# Patient Record
Sex: FEMALE | Race: WHITE | NOT HISPANIC OR LATINO | Employment: PART TIME | URBAN - METROPOLITAN AREA
[De-identification: names, ages, dates, MRNs, and addresses within clinical notes are randomized per-mention and may not be internally consistent; named-entity substitution may affect disease eponyms.]

---

## 2017-09-21 ENCOUNTER — TRANSCRIBE ORDERS (OUTPATIENT)
Dept: ADMINISTRATIVE | Facility: HOSPITAL | Age: 27
End: 2017-09-21

## 2017-09-21 DIAGNOSIS — Z31.41 FERTILITY TESTING: Primary | ICD-10-CM

## 2017-09-29 ENCOUNTER — HOSPITAL ENCOUNTER (OUTPATIENT)
Dept: GENERAL RADIOLOGY | Facility: HOSPITAL | Age: 27
Discharge: HOME | End: 2017-09-29
Attending: SPECIALIST | Admitting: SPECIALIST

## 2017-09-29 DIAGNOSIS — Z31.41 FERTILITY TESTING: ICD-10-CM

## 2017-09-29 PROCEDURE — 0 IOPAMIDOL 61 % SOLUTION: Performed by: SPECIALIST

## 2017-09-29 PROCEDURE — 74740 X-RAY FEMALE GENITAL TRACT: CPT

## 2017-09-29 RX ADMIN — IOPAMIDOL 30 ML: 612 INJECTION, SOLUTION INTRAVENOUS at 08:16

## 2018-06-08 ENCOUNTER — TRANSCRIBE ORDERS (OUTPATIENT)
Dept: OBSTETRICS AND GYNECOLOGY | Facility: HOSPITAL | Age: 28
End: 2018-06-08

## 2018-06-08 DIAGNOSIS — Z36.3 ANTENATAL SCREENING FOR MALFORMATION USING ULTRASONICS: ICD-10-CM

## 2018-06-08 DIAGNOSIS — O30.002 TWIN GESTATION IN SECOND TRIMESTER, UNSPECIFIED MULTIPLE GESTATION TYPE: Primary | ICD-10-CM

## 2018-06-08 DIAGNOSIS — Z31.83 IN VITRO FERTILIZATION: ICD-10-CM

## 2018-06-28 ENCOUNTER — OFFICE VISIT (OUTPATIENT)
Dept: OBSTETRICS AND GYNECOLOGY | Facility: HOSPITAL | Age: 28
End: 2018-06-28

## 2018-06-28 ENCOUNTER — HOSPITAL ENCOUNTER (OUTPATIENT)
Dept: WOMENS IMAGING | Facility: HOSPITAL | Age: 28
Discharge: HOME | End: 2018-06-28
Admitting: OBSTETRICS & GYNECOLOGY

## 2018-06-28 VITALS
HEIGHT: 65 IN | SYSTOLIC BLOOD PRESSURE: 112 MMHG | BODY MASS INDEX: 32.52 KG/M2 | DIASTOLIC BLOOD PRESSURE: 64 MMHG | WEIGHT: 195.2 LBS

## 2018-06-28 DIAGNOSIS — O30.049 DICHORIONIC DIAMNIOTIC TWIN PREGNANCY, ANTEPARTUM: Primary | ICD-10-CM

## 2018-06-28 DIAGNOSIS — O09.819 PREGNANCY RESULTING FROM IN VITRO FERTILIZATION, ANTEPARTUM: ICD-10-CM

## 2018-06-28 DIAGNOSIS — O30.002 TWIN GESTATION IN SECOND TRIMESTER, UNSPECIFIED MULTIPLE GESTATION TYPE: ICD-10-CM

## 2018-06-28 DIAGNOSIS — Z36.3 ANTENATAL SCREENING FOR MALFORMATION USING ULTRASONICS: ICD-10-CM

## 2018-06-28 DIAGNOSIS — Z31.83 IN VITRO FERTILIZATION: ICD-10-CM

## 2018-06-28 PROCEDURE — 76812 OB US DETAILED ADDL FETUS: CPT | Performed by: OBSTETRICS & GYNECOLOGY

## 2018-06-28 PROCEDURE — 76812 OB US DETAILED ADDL FETUS: CPT

## 2018-06-28 PROCEDURE — 76811 OB US DETAILED SNGL FETUS: CPT

## 2018-06-28 PROCEDURE — 76811 OB US DETAILED SNGL FETUS: CPT | Performed by: OBSTETRICS & GYNECOLOGY

## 2018-06-28 RX ORDER — FOLIC ACID 1 MG/1
2 TABLET ORAL DAILY
COMMUNITY
End: 2018-10-24 | Stop reason: HOSPADM

## 2018-06-28 RX ORDER — PRENATAL WITH FERROUS FUM AND FOLIC ACID 3080; 920; 120; 400; 22; 1.84; 3; 20; 10; 1; 12; 200; 27; 25; 2 [IU]/1; [IU]/1; MG/1; [IU]/1; MG/1; MG/1; MG/1; MG/1; MG/1; MG/1; UG/1; MG/1; MG/1; MG/1; MG/1
1 TABLET ORAL DAILY
COMMUNITY

## 2018-06-28 RX ORDER — LEVOTHYROXINE SODIUM 0.05 MG/1
50 TABLET ORAL DAILY
COMMUNITY
End: 2022-12-15

## 2018-07-26 ENCOUNTER — OFFICE VISIT (OUTPATIENT)
Dept: OBSTETRICS AND GYNECOLOGY | Facility: HOSPITAL | Age: 28
End: 2018-07-26

## 2018-07-26 ENCOUNTER — HOSPITAL ENCOUNTER (OUTPATIENT)
Dept: WOMENS IMAGING | Facility: HOSPITAL | Age: 28
Discharge: HOME | End: 2018-07-26
Attending: OBSTETRICS & GYNECOLOGY | Admitting: OBSTETRICS & GYNECOLOGY

## 2018-07-26 VITALS — BODY MASS INDEX: 33.8 KG/M2 | WEIGHT: 200 LBS | DIASTOLIC BLOOD PRESSURE: 66 MMHG | SYSTOLIC BLOOD PRESSURE: 117 MMHG

## 2018-07-26 DIAGNOSIS — O30.042 DICHORIONIC DIAMNIOTIC TWIN PREGNANCY IN SECOND TRIMESTER: Primary | ICD-10-CM

## 2018-07-26 DIAGNOSIS — O30.049 DICHORIONIC DIAMNIOTIC TWIN PREGNANCY, ANTEPARTUM: ICD-10-CM

## 2018-07-26 DIAGNOSIS — O09.812 PREGNANCY RESULTING FROM ASSISTED REPRODUCTIVE TECHNOLOGY IN SECOND TRIMESTER: ICD-10-CM

## 2018-07-26 DIAGNOSIS — O09.819 PREGNANCY RESULTING FROM IN VITRO FERTILIZATION, ANTEPARTUM: ICD-10-CM

## 2018-07-26 PROCEDURE — 76816 OB US FOLLOW-UP PER FETUS: CPT

## 2018-07-26 PROCEDURE — 76825 ECHO EXAM OF FETAL HEART: CPT | Performed by: OBSTETRICS & GYNECOLOGY

## 2018-07-26 PROCEDURE — 93325 DOPPLER ECHO COLOR FLOW MAPG: CPT | Performed by: OBSTETRICS & GYNECOLOGY

## 2018-07-26 PROCEDURE — 93325 DOPPLER ECHO COLOR FLOW MAPG: CPT

## 2018-07-26 PROCEDURE — 76825 ECHO EXAM OF FETAL HEART: CPT

## 2018-07-26 PROCEDURE — 76816 OB US FOLLOW-UP PER FETUS: CPT | Performed by: OBSTETRICS & GYNECOLOGY

## 2018-07-26 NOTE — PROGRESS NOTES
Documentation of the ultasound findings, images, and interpretations with be available in the patient's Viewpoint report located in the Chart Review Imaging tab in [employer].

## 2018-07-26 NOTE — PROGRESS NOTES
Denies problems. Reports genetic screening test was normal. Today reports had some vaginal bleeding at 10 weeks and received RhoGam. States she had another dose of RhoGam yesterday per Dr. Kaplan's order but denies any bleeding or problems. Next OB visit 8/16/2018. Discussed tDap & Flu shot recommendations.

## 2018-08-23 ENCOUNTER — OFFICE VISIT (OUTPATIENT)
Dept: OBSTETRICS AND GYNECOLOGY | Facility: HOSPITAL | Age: 28
End: 2018-08-23

## 2018-08-23 ENCOUNTER — HOSPITAL ENCOUNTER (OUTPATIENT)
Dept: WOMENS IMAGING | Facility: HOSPITAL | Age: 28
Discharge: HOME | End: 2018-08-23
Attending: OBSTETRICS & GYNECOLOGY | Admitting: OBSTETRICS & GYNECOLOGY

## 2018-08-23 VITALS — BODY MASS INDEX: 33.63 KG/M2 | WEIGHT: 199 LBS | SYSTOLIC BLOOD PRESSURE: 125 MMHG | DIASTOLIC BLOOD PRESSURE: 72 MMHG

## 2018-08-23 DIAGNOSIS — O09.812 PREGNANCY RESULTING FROM ASSISTED REPRODUCTIVE TECHNOLOGY IN SECOND TRIMESTER: ICD-10-CM

## 2018-08-23 DIAGNOSIS — O30.042 DICHORIONIC DIAMNIOTIC TWIN PREGNANCY IN SECOND TRIMESTER: ICD-10-CM

## 2018-08-23 DIAGNOSIS — O30.042 DICHORIONIC DIAMNIOTIC TWIN PREGNANCY IN SECOND TRIMESTER: Primary | ICD-10-CM

## 2018-08-23 PROCEDURE — 76816 OB US FOLLOW-UP PER FETUS: CPT | Performed by: OBSTETRICS & GYNECOLOGY

## 2018-08-23 PROCEDURE — 76816 OB US FOLLOW-UP PER FETUS: CPT

## 2018-08-23 NOTE — PROGRESS NOTES
Pt denies lof, vag bleeding or cramping. Pt states she received dose of Rhogam again at 22 weeks per Dr's request to have 12 week Rhogam follow up injection. Denies any bleeding since 10 weeks.

## 2018-09-20 ENCOUNTER — HOSPITAL ENCOUNTER (OUTPATIENT)
Dept: WOMENS IMAGING | Facility: HOSPITAL | Age: 28
Discharge: HOME | End: 2018-09-20
Attending: OBSTETRICS & GYNECOLOGY | Admitting: OBSTETRICS & GYNECOLOGY

## 2018-09-20 ENCOUNTER — OFFICE VISIT (OUTPATIENT)
Dept: OBSTETRICS AND GYNECOLOGY | Facility: HOSPITAL | Age: 28
End: 2018-09-20

## 2018-09-20 VITALS — BODY MASS INDEX: 34.98 KG/M2 | SYSTOLIC BLOOD PRESSURE: 133 MMHG | DIASTOLIC BLOOD PRESSURE: 83 MMHG | WEIGHT: 207 LBS

## 2018-09-20 DIAGNOSIS — O09.812 PREGNANCY RESULTING FROM ASSISTED REPRODUCTIVE TECHNOLOGY IN SECOND TRIMESTER: ICD-10-CM

## 2018-09-20 DIAGNOSIS — O30.042 DICHORIONIC DIAMNIOTIC TWIN PREGNANCY IN SECOND TRIMESTER: Primary | ICD-10-CM

## 2018-09-20 DIAGNOSIS — O30.042 DICHORIONIC DIAMNIOTIC TWIN PREGNANCY IN SECOND TRIMESTER: ICD-10-CM

## 2018-09-20 PROCEDURE — 76816 OB US FOLLOW-UP PER FETUS: CPT | Performed by: OBSTETRICS & GYNECOLOGY

## 2018-09-20 PROCEDURE — 76816 OB US FOLLOW-UP PER FETUS: CPT

## 2018-09-20 NOTE — PROGRESS NOTES
Pt denies lof, vag bleeding or cramping. States she has noticed increased swelling bilaterally in feet and lower legs the last several days but states she has been working and on her feet a lot. Denies ha or blurred vision.

## 2018-09-20 NOTE — PROGRESS NOTES
Documentation of the ultasound findings, images, and interpretations with be available in the patient's Viewpoint report located in the Chart Review Imaging tab in [employer].

## 2018-10-18 ENCOUNTER — HOSPITAL ENCOUNTER (INPATIENT)
Facility: HOSPITAL | Age: 28
LOS: 6 days | Discharge: HOME | End: 2018-10-24
Attending: OBSTETRICS & GYNECOLOGY | Admitting: OBSTETRICS & GYNECOLOGY

## 2018-10-18 ENCOUNTER — HOSPITAL ENCOUNTER (OUTPATIENT)
Dept: WOMENS IMAGING | Facility: HOSPITAL | Age: 28
Discharge: HOME | End: 2018-10-18
Attending: OBSTETRICS & GYNECOLOGY | Admitting: OBSTETRICS & GYNECOLOGY

## 2018-10-18 ENCOUNTER — OFFICE VISIT (OUTPATIENT)
Dept: OBSTETRICS AND GYNECOLOGY | Facility: HOSPITAL | Age: 28
End: 2018-10-18

## 2018-10-18 VITALS — DIASTOLIC BLOOD PRESSURE: 98 MMHG | SYSTOLIC BLOOD PRESSURE: 146 MMHG | BODY MASS INDEX: 35.46 KG/M2 | WEIGHT: 209.8 LBS

## 2018-10-18 DIAGNOSIS — O09.812 PREGNANCY RESULTING FROM ASSISTED REPRODUCTIVE TECHNOLOGY IN SECOND TRIMESTER: ICD-10-CM

## 2018-10-18 DIAGNOSIS — O30.042 DICHORIONIC DIAMNIOTIC TWIN PREGNANCY IN SECOND TRIMESTER: ICD-10-CM

## 2018-10-18 DIAGNOSIS — O16.3 ELEVATED BLOOD PRESSURE AFFECTING PREGNANCY IN THIRD TRIMESTER, ANTEPARTUM: ICD-10-CM

## 2018-10-18 DIAGNOSIS — O30.009 TWIN DELIVERY BY C-SECTION: ICD-10-CM

## 2018-10-18 DIAGNOSIS — O30.043 DICHORIONIC DIAMNIOTIC TWIN PREGNANCY IN THIRD TRIMESTER: Primary | ICD-10-CM

## 2018-10-18 DIAGNOSIS — O14.93 PREECLAMPSIA, THIRD TRIMESTER: ICD-10-CM

## 2018-10-18 PROBLEM — Z34.90 PREGNANCY: Status: ACTIVE | Noted: 2018-10-18

## 2018-10-18 LAB
ABO GROUP BLD: NORMAL
ALP SERPL-CCNC: 260 U/L (ref 25–100)
ALT SERPL W P-5'-P-CCNC: 10 U/L (ref 7–40)
ANTI-D, PASSIVE: NORMAL
AST SERPL-CCNC: 19 U/L (ref 0–33)
BACTERIA UR QL AUTO: ABNORMAL /HPF
BILIRUB BLD-MCNC: NEGATIVE MG/DL
BILIRUB SERPL-MCNC: 0.2 MG/DL (ref 0.3–1.2)
BILIRUB UR QL STRIP: NEGATIVE
BLD GP AB SCN SERPL QL: POSITIVE
CLARITY UR: CLEAR
CLARITY, POC: CLEAR
COLOR UR: YELLOW
COLOR UR: YELLOW
CREAT BLD-MCNC: 0.77 MG/DL (ref 0.6–1.3)
CREAT BLD-MCNC: 0.77 MG/DL (ref 0.6–1.3)
DEPRECATED RDW RBC AUTO: 43.7 FL (ref 37–54)
ERYTHROCYTE [DISTWIDTH] IN BLOOD BY AUTOMATED COUNT: 14.2 % (ref 11.3–14.5)
GFR SERPL CREATININE-BSD FRML MDRD: 89 ML/MIN/1.73
GLUCOSE UR STRIP-MCNC: NEGATIVE MG/DL
GLUCOSE UR STRIP-MCNC: NEGATIVE MG/DL
HCT VFR BLD AUTO: 33.1 % (ref 34.5–44)
HGB BLD-MCNC: 10.7 G/DL (ref 11.5–15.5)
HGB UR QL STRIP.AUTO: NEGATIVE
HYALINE CASTS UR QL AUTO: ABNORMAL /LPF
KETONES UR QL STRIP: NEGATIVE
KETONES UR QL: NEGATIVE
LDH SERPL-CCNC: 228 U/L (ref 120–246)
LEUKOCYTE EST, POC: NEGATIVE
LEUKOCYTE ESTERASE UR QL STRIP.AUTO: ABNORMAL
MCH RBC QN AUTO: 27.4 PG (ref 27–31)
MCHC RBC AUTO-ENTMCNC: 32.3 G/DL (ref 32–36)
MCV RBC AUTO: 84.7 FL (ref 80–99)
NITRITE UR QL STRIP: NEGATIVE
NITRITE UR-MCNC: NEGATIVE MG/ML
PH UR STRIP.AUTO: 5.5 [PH] (ref 5–8)
PH UR: 6 [PH] (ref 5–8)
PLATELET # BLD AUTO: 161 10*3/MM3 (ref 150–450)
PMV BLD AUTO: 12 FL (ref 6–12)
PROT UR QL STRIP: ABNORMAL
PROT UR STRIP-MCNC: ABNORMAL MG/DL
RBC # BLD AUTO: 3.91 10*6/MM3 (ref 3.89–5.14)
RBC # UR STRIP: NEGATIVE /UL
RBC # UR: ABNORMAL /HPF
REF LAB TEST METHOD: ABNORMAL
RH BLD: NEGATIVE
SP GR UR STRIP: 1.01 (ref 1–1.03)
SP GR UR: 1.01 (ref 1–1.03)
SQUAMOUS #/AREA URNS HPF: ABNORMAL /HPF
T&S EXPIRATION DATE: NORMAL
URATE SERPL-MCNC: 6.1 MG/DL (ref 3.1–7.8)
UROBILINOGEN UR QL STRIP: ABNORMAL
UROBILINOGEN UR QL: NORMAL
WBC NRBC COR # BLD: 8.11 10*3/MM3 (ref 3.5–10.8)
WBC UR QL AUTO: ABNORMAL /HPF

## 2018-10-18 PROCEDURE — 86850 RBC ANTIBODY SCREEN: CPT | Performed by: OBSTETRICS & GYNECOLOGY

## 2018-10-18 PROCEDURE — 99223 1ST HOSP IP/OBS HIGH 75: CPT | Performed by: OBSTETRICS & GYNECOLOGY

## 2018-10-18 PROCEDURE — 85027 COMPLETE CBC AUTOMATED: CPT | Performed by: OBSTETRICS & GYNECOLOGY

## 2018-10-18 PROCEDURE — 84450 TRANSFERASE (AST) (SGOT): CPT | Performed by: OBSTETRICS & GYNECOLOGY

## 2018-10-18 PROCEDURE — 84460 ALANINE AMINO (ALT) (SGPT): CPT | Performed by: OBSTETRICS & GYNECOLOGY

## 2018-10-18 PROCEDURE — 86901 BLOOD TYPING SEROLOGIC RH(D): CPT | Performed by: OBSTETRICS & GYNECOLOGY

## 2018-10-18 PROCEDURE — 25010000002 BETAMETHASONE ACET & SOD PHOS PER 4 MG: Performed by: OBSTETRICS & GYNECOLOGY

## 2018-10-18 PROCEDURE — 83615 LACTATE (LD) (LDH) ENZYME: CPT | Performed by: OBSTETRICS & GYNECOLOGY

## 2018-10-18 PROCEDURE — 82247 BILIRUBIN TOTAL: CPT | Performed by: OBSTETRICS & GYNECOLOGY

## 2018-10-18 PROCEDURE — 84550 ASSAY OF BLOOD/URIC ACID: CPT | Performed by: OBSTETRICS & GYNECOLOGY

## 2018-10-18 PROCEDURE — 76816 OB US FOLLOW-UP PER FETUS: CPT

## 2018-10-18 PROCEDURE — 76820 UMBILICAL ARTERY ECHO: CPT

## 2018-10-18 PROCEDURE — 76819 FETAL BIOPHYS PROFIL W/O NST: CPT | Performed by: OBSTETRICS & GYNECOLOGY

## 2018-10-18 PROCEDURE — 76819 FETAL BIOPHYS PROFIL W/O NST: CPT

## 2018-10-18 PROCEDURE — 25010000002 MAGNESIUM SULFATE-LACT RINGERS 40 GM/580ML SOLUTION: Performed by: OBSTETRICS & GYNECOLOGY

## 2018-10-18 PROCEDURE — 82565 ASSAY OF CREATININE: CPT | Performed by: OBSTETRICS & GYNECOLOGY

## 2018-10-18 PROCEDURE — 81001 URINALYSIS AUTO W/SCOPE: CPT | Performed by: OBSTETRICS & GYNECOLOGY

## 2018-10-18 PROCEDURE — 86870 RBC ANTIBODY IDENTIFICATION: CPT | Performed by: OBSTETRICS & GYNECOLOGY

## 2018-10-18 PROCEDURE — 86900 BLOOD TYPING SEROLOGIC ABO: CPT | Performed by: OBSTETRICS & GYNECOLOGY

## 2018-10-18 PROCEDURE — 59025 FETAL NON-STRESS TEST: CPT

## 2018-10-18 PROCEDURE — 76820 UMBILICAL ARTERY ECHO: CPT | Performed by: OBSTETRICS & GYNECOLOGY

## 2018-10-18 PROCEDURE — 76816 OB US FOLLOW-UP PER FETUS: CPT | Performed by: OBSTETRICS & GYNECOLOGY

## 2018-10-18 PROCEDURE — 84075 ASSAY ALKALINE PHOSPHATASE: CPT | Performed by: OBSTETRICS & GYNECOLOGY

## 2018-10-18 RX ORDER — BETAMETHASONE SODIUM PHOSPHATE AND BETAMETHASONE ACETATE 3; 3 MG/ML; MG/ML
12 INJECTION, SUSPENSION INTRA-ARTICULAR; INTRALESIONAL; INTRAMUSCULAR; SOFT TISSUE EVERY 24 HOURS
Status: COMPLETED | OUTPATIENT
Start: 2018-10-18 | End: 2018-10-19

## 2018-10-18 RX ORDER — LABETALOL HYDROCHLORIDE 5 MG/ML
20-80 INJECTION, SOLUTION INTRAVENOUS
Status: DISPENSED | OUTPATIENT
Start: 2018-10-18 | End: 2018-10-19

## 2018-10-18 RX ORDER — DIPHENHYDRAMINE HCL 25 MG
25 CAPSULE ORAL EVERY 6 HOURS PRN
COMMUNITY
End: 2018-10-24 | Stop reason: HOSPADM

## 2018-10-18 RX ORDER — AZITHROMYCIN 250 MG/1
250 TABLET, FILM COATED ORAL DAILY
COMMUNITY
End: 2018-10-24 | Stop reason: HOSPADM

## 2018-10-18 RX ORDER — SODIUM CHLORIDE 0.9 % (FLUSH) 0.9 %
3 SYRINGE (ML) INJECTION EVERY 12 HOURS SCHEDULED
Status: DISCONTINUED | OUTPATIENT
Start: 2018-10-18 | End: 2018-10-23

## 2018-10-18 RX ORDER — MAGNESIUM SULF/RINGERS LACTATE 40 G/500ML
2 PLASTIC BAG, INJECTION (ML) INTRAVENOUS CONTINUOUS
Status: DISCONTINUED | OUTPATIENT
Start: 2018-10-18 | End: 2018-10-21

## 2018-10-18 RX ORDER — PRENATAL VIT/IRON FUM/FOLIC AC 27MG-0.8MG
1 TABLET ORAL DAILY
Status: DISCONTINUED | OUTPATIENT
Start: 2018-10-18 | End: 2018-10-19

## 2018-10-18 RX ORDER — DEXTROSE AND SODIUM CHLORIDE 5; .2 G/100ML; G/100ML
96 INJECTION, SOLUTION INTRAVENOUS CONTINUOUS
Status: DISCONTINUED | OUTPATIENT
Start: 2018-10-18 | End: 2018-10-23

## 2018-10-18 RX ORDER — LEVOTHYROXINE SODIUM 0.03 MG/1
50 TABLET ORAL
Status: DISCONTINUED | OUTPATIENT
Start: 2018-10-19 | End: 2018-10-24 | Stop reason: HOSPADM

## 2018-10-18 RX ORDER — SODIUM CHLORIDE 0.9 % (FLUSH) 0.9 %
5 SYRINGE (ML) INJECTION AS NEEDED
Status: DISCONTINUED | OUTPATIENT
Start: 2018-10-18 | End: 2018-10-20

## 2018-10-18 RX ORDER — GUAIFENESIN AND DEXTROMETHORPHAN HYDROBROMIDE 600; 30 MG/1; MG/1
1 TABLET, EXTENDED RELEASE ORAL 2 TIMES DAILY PRN
Status: DISCONTINUED | OUTPATIENT
Start: 2018-10-18 | End: 2018-10-24 | Stop reason: HOSPADM

## 2018-10-18 RX ORDER — FAMOTIDINE 10 MG/ML
20 INJECTION, SOLUTION INTRAVENOUS 2 TIMES DAILY PRN
Status: DISCONTINUED | OUTPATIENT
Start: 2018-10-18 | End: 2018-10-23

## 2018-10-18 RX ADMIN — DEXTROSE AND SODIUM CHLORIDE 96 ML/HR: 5; 200 INJECTION, SOLUTION INTRAVENOUS at 17:00

## 2018-10-18 RX ADMIN — PRENATAL VIT W/ FE FUMARATE-FA TAB 27-0.8 MG 1 TABLET: 27-0.8 TAB at 17:30

## 2018-10-18 RX ADMIN — LABETALOL HYDROCHLORIDE 20 MG: 5 INJECTION INTRAVENOUS at 23:52

## 2018-10-18 RX ADMIN — LABETALOL HYDROCHLORIDE 20 MG: 5 INJECTION INTRAVENOUS at 23:04

## 2018-10-18 RX ADMIN — FAMOTIDINE 20 MG: 10 INJECTION, SOLUTION INTRAVENOUS at 23:54

## 2018-10-18 RX ADMIN — LABETALOL HYDROCHLORIDE 20 MG: 5 INJECTION INTRAVENOUS at 19:31

## 2018-10-18 RX ADMIN — BETAMETHASONE SODIUM PHOSPHATE AND BETAMETHASONE ACETATE 12 MG: 3; 3 INJECTION, SUSPENSION INTRA-ARTICULAR; INTRALESIONAL; INTRAMUSCULAR at 17:30

## 2018-10-18 RX ADMIN — Medication 2 G/HR: at 17:39

## 2018-10-18 NOTE — PROGRESS NOTES
Documentation of the ultasound findings, images, and interpretations with be available in the patient's Viewpoint report located in the Chart Review Imaging tab in [employer]

## 2018-10-18 NOTE — PROGRESS NOTES
Reports has a cold; she picked up a rx for z-júnior today but has not started it yet. Next OB visit in one week. Admits is stressed because she arrived late due to traffic. Denies all s/s of preeclampsia.

## 2018-10-18 NOTE — H&P
Maternal Fetal Medicine Admission Note    Patient Name:  Yamilex Carty  :  1990  MRN:  [id number]  Date of Service:  10/18/2018  Referring Provider: Jen Kaplan     ID:  28 y.o.  at 34w0d    Chief Complaint:   Chief Complaint   Patient presents with   • Headache   • fetal growth lag       Pregnancy course significant for:    Patient Active Problem List   Diagnosis   • Dichorionic diamniotic twin pregnancy in second trimester   • Pregnancy resulting from assisted reproductive technology in second trimester   • Pregnancy   • Preeclampsia, third trimester       History of Present Illness:  28-year-old white female  1 para 0 at 34 weeks 0 days gestation with prenatal care by Dr. Jen Kaplan in [facility].  The patient has a twin intrauterine pregnancy diamniotic / dichorionic as a result of in vitro fertilization.  She presented to our office today in follow-up for fetal growth.  She complains of an on and off headache since last .  She has noted that the headache was relieved with Tylenol.  She has noted some decreased movement in the past week, particularly at night.   On exam today, her initial blood pressure was 149/100 with a repeat value of 146/98.  Urine protein is 2+ on dipstick.  She denies right upper quadrant pain, leakage of fluid per vagina and vaginal bleeding.  An ultrasound notes that Twin A has appropriate growth with an estimated fetal weight of 2436 g. Amniotic fluid, biophysical profile and Dopplers are zaina for Twin A.  Twin B unfortunately reveals an overall 2 week lag in growth with a four-week lag in the fetal abdominal circumference measurement.  The estimated fetal weight is 1705 g.  The umbilical artery Dopplers reveal absent end-diastolic flow.  The biophysical profile was however 8 of 8.  I discussed the clinical situation by phone with Dr Kaplan.  I noted that inpatient evaluation of preeclampsia was necessary given the above findings.    "Kaplan noted that at the current gestational age, if delivery was required within the next week that the infants would be transported out of [place].  As such, we elected for admission to [facility]    Prenatal Labs   Lab Results   Component Value Date    HGB 10.7 (L) 10/18/2018    ABO O 10/18/2018    RH Negative 10/18/2018    ABSCRN Positive 10/18/2018       REVIEW OF SYSTEMS    Negative: Fever, chills, vision change, shortness of breath, nausea, vomiting, rash, joint pain, rupture of membranes, vaginal bleeding, right upper quadrant pain, contractions  Positive: Sore throat starting on , swelling described as \"a little bit\", dizziness since Tuesday, bruising in her legs for the past few weeks, headache beginning  but improved with Tylenol.  She has noted decreased fetal movement over the past 1 week particularly at night.    OB HISTORY    OB History    Para Term  AB Living   1 0 0 0 0 0   SAB TAB Ectopic Molar Multiple Live Births   0 0 0 0 0 0      # Outcome Date GA Lbr Hay/2nd Weight Sex Delivery Anes PTL Lv   1 Current               Obstetric Comments   Pregnancy #1 - IVF conception; fresh cycle; no donors.        PAST MEDICAL HISTORY    Past Medical History:   Diagnosis Date   • Female infertility unexplained after evaluation    • History of anxiety     stopped meds    • Hypothyroidism 2017       PAST SURGICAL HISTORY      has a past surgical history that includes Tonsillectomy and adenoidectomy (); [place] tooth extraction (); endoscopy and colonoscopy (); and Fertility Surgery (2018).    CURRENT MEDICATIONS       Current Facility-Administered Medications:   •  betamethasone acetate-betamethasone sodium phosphate (CELESTONE SOLUSPAN) injection 12 mg, 12 mg, Intramuscular, Q24H, Bari Hughes, DO, 12 mg at 10/18/18 1730  •  dextrose 5 % and sodium chloride 0.2 % infusion, 96 mL/hr, Intravenous, Continuous, Bari Hughes, DO, Last Rate: " 96 mL/hr at 10/18/18 1700, 96 mL/hr at 10/18/18 1700  •  [START ON 10/19/2018] levothyroxine (SYNTHROID, LEVOTHROID) tablet 50 mcg, 50 mcg, Oral, Q AM, Bari Hughes DO  •  Magnesium Sulfate-Lact Ringers 40 GM/580ML, 2 g/hr, Intravenous, Continuous, Bari Hughes, DO, Last Rate: 29 mL/hr at 10/18/18 1739, 2 g/hr at 10/18/18 1739  •  prenatal vitamin 27-0.8 tablet 1 tablet, 1 tablet, Oral, Daily, Bari Hughes DO, 1 tablet at 10/18/18 1730  •  sodium chloride 0.9 % flush 3 mL, 3 mL, Intravenous, Q12H, Bari Hughes DO  •  sodium chloride 0.9 % flush 5 mL, 5 mL, Intravenous, PRN, Bari Hughes DO    ALLERGIES     Naproxen which causes hives    SOCIAL HISTORY     History   Smoking Status   • Never Smoker   Smokeless Tobacco   • Never Used     History   Alcohol Use No     History   Drug Use No   The patient is been  for 4 years.  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  Eyes:  PERRLA, EOMI  ENMT:  Supple, No TM   Respiratory:  Lungs are clear to auscultation bilaterally, normal breath sounds.   Cardiovascular:  Normal rate and rhythm, no murmurs.   Gastrointestinal:  Soft, gravid, nontender.  She has a tattoo of a star in her right lower abdomen  Uterus: Soft, nontender. Fundal height accelerated for dates.  The fetuses are currently in the transverse transverse presentations  Neurologic:  Alert &  oriented x 3,  no focal deficits noted.  DTRs 3+  Psychiatric:  Speech and behavior appropriate.   Extremities: no cyanosis, clubbing. Edema 1+, no evidence of DVT.    Fetal heart rate tracing review  Non-Stress Test:    Fetal Heart Rate Assessment   Method:     Beats/min:     Baseline:     Varibility:     Accels:     Decels:     Tracing Category:       Uterine Assessment   Method:     Frequency (min):     Ctx Count in 10 min:     Duration:     Intensity:     Intensity by IUPC:     Resting Tone:     Resting Tone by IUPC:     [place] Units:       Cervix: Exam by:     Dilation:     Effacement:     Station:       Ultrasound:  Twin intrauterine pregnancy in the transverse transverse presentations.  Diamniotic dichorionic.  Estimated fetal weight twin A 2436 g.  Twin B 1705 g.  Absent end-diastolic flow on umbilical Doppler studies for twin B    Labs:  Labs:  Lab Results   Component Value Date    WBC 8.11 10/18/2018    HGB 10.7 (L) 10/18/2018    HCT 33.1 (L) 10/18/2018    MCV 84.7 10/18/2018     10/18/2018    URICACID 6.1 10/18/2018    AST 19 10/18/2018    ALT 10 10/18/2018     10/18/2018       Results from last 7 days  Lab Units 10/18/18  1547   ABO TYPING  O   RH TYPING  Negative   ANTIBODY SCREEN  Positive       ASSESSMENT/PLAN:       28 y.o. female  at 34w0d with Preeclampsia, third trimester  1.  Twin intrauterine pregnancy transverse / transverse presentations,  diamniotic / dichorionic  2.  Hypothyroidism  3.  Obesity with a BMI of 35  4.  History of anxiety  5.  Abnormal umbilical artery Doppler studies for twin B with growth restriction for twin B    Plan  1.  Admit to labor and delivery  2.  Steroids for fetal lung maturity enhancement  3.  When necessary labetalol for blood pressure greater than 160/110   4.  Should the patient meet persistent severe criteria of preeclampsia, would advocate magnesium sulfate for antiseizure prophylaxis and proceeding with delivery.  Given the transverse  / transverse presentations  delivery will be necessary  5.  NICU consultation given probable need for  delivery        Approximately 35 minutes floor time was spent in care of this patient, of which greater than 50% was spent in face-to-face consultation and coordination of care.    Jenaro Meyer MD     10/18/2018   6:08 PM

## 2018-10-19 PROCEDURE — 82575 CREATININE CLEARANCE TEST: CPT | Performed by: OBSTETRICS & GYNECOLOGY

## 2018-10-19 PROCEDURE — 25010000002 ONDANSETRON PER 1 MG: Performed by: OBSTETRICS & GYNECOLOGY

## 2018-10-19 PROCEDURE — 25010000002 BETAMETHASONE ACET & SOD PHOS PER 4 MG: Performed by: OBSTETRICS & GYNECOLOGY

## 2018-10-19 PROCEDURE — 59025 FETAL NON-STRESS TEST: CPT

## 2018-10-19 PROCEDURE — S0260 H&P FOR SURGERY: HCPCS | Performed by: OBSTETRICS & GYNECOLOGY

## 2018-10-19 PROCEDURE — 99231 SBSQ HOSP IP/OBS SF/LOW 25: CPT | Performed by: OBSTETRICS & GYNECOLOGY

## 2018-10-19 PROCEDURE — 81050 URINALYSIS VOLUME MEASURE: CPT | Performed by: OBSTETRICS & GYNECOLOGY

## 2018-10-19 PROCEDURE — 84156 ASSAY OF PROTEIN URINE: CPT | Performed by: OBSTETRICS & GYNECOLOGY

## 2018-10-19 RX ORDER — ONDANSETRON 2 MG/ML
4 INJECTION INTRAMUSCULAR; INTRAVENOUS ONCE
Status: COMPLETED | OUTPATIENT
Start: 2018-10-19 | End: 2018-10-19

## 2018-10-19 RX ORDER — PRENATAL VIT/IRON FUM/FOLIC AC 27MG-0.8MG
1 TABLET ORAL
Status: DISCONTINUED | OUTPATIENT
Start: 2018-10-19 | End: 2018-10-24 | Stop reason: HOSPADM

## 2018-10-19 RX ADMIN — DEXTROSE AND SODIUM CHLORIDE 96 ML/HR: 5; 200 INJECTION, SOLUTION INTRAVENOUS at 19:36

## 2018-10-19 RX ADMIN — GUAIFENESIN AND DEXTROMETHORPHAN HYDROBROMIDE 1 TABLET: 600; 30 TABLET, EXTENDED RELEASE ORAL at 16:31

## 2018-10-19 RX ADMIN — GUAIFENESIN AND DEXTROMETHORPHAN HYDROBROMIDE 1 TABLET: 600; 30 TABLET, EXTENDED RELEASE ORAL at 09:40

## 2018-10-19 RX ADMIN — DEXTROSE AND SODIUM CHLORIDE 96 ML/HR: 5; 200 INJECTION, SOLUTION INTRAVENOUS at 11:42

## 2018-10-19 RX ADMIN — ONDANSETRON 4 MG: 2 INJECTION, SOLUTION INTRAMUSCULAR; INTRAVENOUS at 11:42

## 2018-10-19 RX ADMIN — PRENATAL VIT W/ FE FUMARATE-FA TAB 27-0.8 MG 1 TABLET: 27-0.8 TAB at 20:59

## 2018-10-19 RX ADMIN — BETAMETHASONE SODIUM PHOSPHATE AND BETAMETHASONE ACETATE 12 MG: 3; 3 INJECTION, SUSPENSION INTRA-ARTICULAR; INTRALESIONAL; INTRAMUSCULAR at 16:31

## 2018-10-19 RX ADMIN — LEVOTHYROXINE SODIUM 50 MCG: 0.05 TABLET ORAL at 06:45

## 2018-10-19 NOTE — PROGRESS NOTES
"[facility]  Obstetric Progress Note    Chief Complaint:  Chief Complaint   Patient presents with   • Headache   • fetal growth lag       Subjective     Patient:    The patient feels well.      Objective     Vital Signs Range for the last 24 hours  Temp:  [96.1 °F (35.6 °C)-99.3 °F (37.4 °C)] 96.1 °F (35.6 °C)   Temp src: Axillary   BP: (103-178)/() 151/95   Heart Rate:  [] 95   Resp:  [14-18] 16               Weight:  [95.2 kg (209 lb 12.8 oz)] 95.2 kg (209 lb 12.8 oz)       Flowsheet Rows      First Filed Value   Admission Height  163.8 cm (64.5\") Documented at 10/18/2018 1512   Admission Weight  --          Intake/Output last 24 hours:      Intake/Output Summary (Last 24 hours) at 10/19/18 0831  Last data filed at 10/19/18 0810   Gross per 24 hour   Intake             1500 ml   Output             5050 ml   Net            -3550 ml       Intake/Output this shift:    I/O this shift:  In: -   Out: 800 [Urine:800]    Physical Exam:  General: Patient is comfortable   Heart CVS exam: normal rate and regular rhythm.   Lungs Chest: no tachypnea, retractions or cyanosis.     Abdomen Abdominal exam: uterus is nontender   Extremities Exam of extremities: not examined     Presentation:    Cervix: Exam by:     Dilation:     Effacement:     Station:           Fetal Heart Rate Assessment   Method:     Beats/min:     Baseline:     Varibility:     Accels:     Decels:     Tracing Category:       Uterine Assessment   Method:     Frequency (min):     Ctx Count in 10 min:     Duration:     Intensity:     Intensity by IUPC:     Resting Tone:     Resting Tone by IUPC:     [place] Units:         Assessment/Plan       Preeclampsia, third trimester    Dichorionic diamniotic twin pregnancy in second trimester    Pregnancy resulting from assisted reproductive technology in second trimester    Pregnancy        Assessment:  1.  Intrauterine pregnancy at 34w1d weeks gestation with reactive fetal status.    2.  Preeclampsia  3.  " Obstetrical history significant for is non-contributory.  4.  GBS status: No results found for: GBSANTIGEN    Plan:  1. fetal and uterine monitoring  intermittent   With severe preeclampsia and AEDF, recommend delivery in steroid window  2. Plan of care has been reviewed with patient.  3.  Risks, benefits of treatment plan have been discussed.  4.  All questions have been answered.  5.      Douglas A. Milligan, MD  10/19/2018  8:31 AM

## 2018-10-19 NOTE — PAYOR COMM NOTE
"Yamilex Spaulding N (28 y.o. Female)   Auth#[id number]  Inpatient clinicals      Date of Birth Social Security Number Address Home Phone MRN    1990  [address] [place] [address] [phone] [id number]    Religious Marital Status          Zoroastrian        Admission Date Admission Type Admitting Provider Attending Provider Department, Room/Bed    10/18/18 Elective Bari Hughes DO Lewellen, Thomas L, DO [facility] ANTEPARTUM, N324/1    Discharge Date Discharge Disposition Discharge Destination                       Attending Provider:  Bari Hughes DO    Allergies:  Naproxen    Isolation:  None   Infection:  None   Code Status:  CPR    Ht:  163.8 cm (64.5\")   Wt:  95.2 kg (209 lb 12.8 oz)    Admission Cmt:  None   Principal Problem:  Preeclampsia, third trimester [O14.93]                 Active Insurance as of 10/18/2018     Primary Coverage     Payor Plan Insurance Group Employer/Plan Group    ANTHEM BLUE CROSS ANTHEM BLUE CROSS BLUE SHIELD PPO [id number]     Payor Plan Address Payor Plan Phone Number Effective From Effective To    [address] [phone] 8/20/2017     [facility] [id number]       Subscriber Name Subscriber Birth Date Member ID       BUBBA SPAULDING 4/7/1987 [id number]                 Emergency Contacts      (Rel.) Home Phone Work Phone Mobile Phone    Bubba Spaulding (Spouse) [phone] -- --            Insurance Information                ANTHEM BLUE CROSS/ANTHEM BLUE CROSS BLUE SHIELD PPO Phone: [phone]    Subscriber: Bubba Spaulding Subscriber#: [id number]    Group#: [id number] Precert#:           Treatment Team     Provider Relationship Specialty Contact    Bari Hughes DO Attending, Surgeon Obstetrics and Gynecology  [phone]    Dina Avendaño, RN Registered Nurse Wound Care      Shawna Lynn, RD Dietitian Nutrition  [phone]    Britney Newberry Patient Care Technician --      Evelyne Garcia, JACQUELINE " Registered Nurse --      Ruchi Frias RN Registered Nurse Emergency Medicine            Problem List           Codes Noted - Resolved       Hospital    Pregnancy ICD-10-CM: Z34.90  ICD-9-CM: V22.2 10/18/2018 - Present    * (Principal)Preeclampsia, third trimester ICD-10-CM: O14.93  ICD-9-CM: 642.43 10/18/2018 - Present    Dichorionic diamniotic twin pregnancy in second trimester ICD-10-CM: O30.042  ICD-9-CM: 651.03, V91.03 2018 - Present    Pregnancy resulting from assisted reproductive technology in second trimester ICD-10-CM: O09.812  ICD-9-CM: V23.85 2018 - Present             History & Physical      Jenaro Meyer MD at 10/18/2018  5:27 PM          Maternal Fetal Medicine Admission Note    Patient Name:  Yamilex Carty  :  1990  MRN:  [id number]  Date of Service:  10/18/2018  Referring Provider: Jen Kaplan     ID:  28 y.o.  at 34w0d    Chief Complaint:   Chief Complaint   Patient presents with   • Headache   • fetal growth lag       Pregnancy course significant for:    Patient Active Problem List   Diagnosis   • Dichorionic diamniotic twin pregnancy in second trimester   • Pregnancy resulting from assisted reproductive technology in second trimester   • Pregnancy   • Preeclampsia, third trimester       History of Present Illness:  28-year-old white female  1 para 0 at 34 weeks 0 days gestation with prenatal care by Dr. Jen Kaplan in [facility].  The patient has a twin intrauterine pregnancy diamniotic / dichorionic as a result of in vitro fertilization.  She presented to our office today in follow-up for fetal growth.  She complains of an on and off headache since last .  She has noted that the headache was relieved with Tylenol.  She has noted some decreased movement in the past week, particularly at night.   On exam today, her initial blood pressure was 149/100 with a repeat value of 146/98.  Urine protein is 2+ on dipstick.  She denies  "right upper quadrant pain, leakage of fluid per vagina and vaginal bleeding.  An ultrasound notes that Twin A has appropriate growth with an estimated fetal weight of 2436 g. Amniotic fluid, biophysical profile and Dopplers are zaina for Twin A.  Twin B unfortunately reveals an overall 2 week lag in growth with a four-week lag in the fetal abdominal circumference measurement.  The estimated fetal weight is 1705 g.  The umbilical artery Dopplers reveal absent end-diastolic flow.  The biophysical profile was however 8 of 8.  I discussed the clinical situation by phone with Dr Kaplan.  I noted that inpatient evaluation of preeclampsia was necessary given the above findings.  Dr. Kaplan noted that at the current gestational age, if delivery was required within the next week that the infants would be transported out of [place].  As such, we elected for admission to [facility]    Prenatal Labs   Lab Results   Component Value Date    HGB 10.7 (L) 10/18/2018    ABO O 10/18/2018    RH Negative 10/18/2018    ABSCRN Positive 10/18/2018       REVIEW OF SYSTEMS    Negative: Fever, chills, vision change, shortness of breath, nausea, vomiting, rash, joint pain, rupture of membranes, vaginal bleeding, right upper quadrant pain, contractions  Positive: Sore throat starting on , swelling described as \"a little bit\", dizziness since Tuesday, bruising in her legs for the past few weeks, headache beginning  but improved with Tylenol.  She has noted decreased fetal movement over the past 1 week particularly at night.    OB HISTORY    OB History    Para Term  AB Living   1 0 0 0 0 0   SAB TAB Ectopic Molar Multiple Live Births   0 0 0 0 0 0      # Outcome Date GA Lbr Hay/2nd Weight Sex Delivery Anes PTL Lv   1 Current               Obstetric Comments   Pregnancy #1 - IVF conception; fresh cycle; no donors.        PAST MEDICAL HISTORY    Past Medical History:   Diagnosis Date   • Female " infertility unexplained after evaluation    • History of anxiety     stopped meds 2012   • Hypothyroidism 09/2017       PAST SURGICAL HISTORY      has a past surgical history that includes Tonsillectomy and adenoidectomy (1998); [place] tooth extraction (2006); endoscopy and colonoscopy (2009); and Fertility Surgery (03/2018).    CURRENT MEDICATIONS       Current Facility-Administered Medications:   •  betamethasone acetate-betamethasone sodium phosphate (CELESTONE SOLUSPAN) injection 12 mg, 12 mg, Intramuscular, Q24H, Bari Hughes DO, 12 mg at 10/18/18 1730  •  dextrose 5 % and sodium chloride 0.2 % infusion, 96 mL/hr, Intravenous, Continuous, Bari Hughes DO, Last Rate: 96 mL/hr at 10/18/18 1700, 96 mL/hr at 10/18/18 1700  •  [START ON 10/19/2018] levothyroxine (SYNTHROID, LEVOTHROID) tablet 50 mcg, 50 mcg, Oral, Q AM, Bari Hughes DO  •  Magnesium Sulfate-Lact Ringers 40 GM/580ML, 2 g/hr, Intravenous, Continuous, Bari Hughes DO, Last Rate: 29 mL/hr at 10/18/18 1739, 2 g/hr at 10/18/18 1739  •  prenatal vitamin 27-0.8 tablet 1 tablet, 1 tablet, Oral, Daily, Bari Hughes DO, 1 tablet at 10/18/18 1730  •  sodium chloride 0.9 % flush 3 mL, 3 mL, Intravenous, Q12H, Bari Hughes DO  •  sodium chloride 0.9 % flush 5 mL, 5 mL, Intravenous, PRN, Bari Hughes DO    ALLERGIES     Naproxen which causes hives    SOCIAL HISTORY     History   Smoking Status   • Never Smoker   Smokeless Tobacco   • Never Used     History   Alcohol Use No     History   Drug Use No   The patient is been  for 4 years.  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  Eyes:  PERRLA, EOMI  ENMT:  Supple, No TM   Respiratory:  Lungs are clear to auscultation bilaterally, normal breath sounds.   Cardiovascular:  Normal rate and rhythm, no murmurs.   Gastrointestinal:  Soft, gravid, nontender.  She has a tattoo of a star in her right lower abdomen  Uterus: Soft, nontender. Fundal height accelerated for dates.  The fetuses are currently in the transverse transverse presentations  Neurologic:  Alert & oriented x 3,  no focal deficits noted.  DTRs 3+  Psychiatric:  Speech and behavior appropriate.   Extremities: no cyanosis, clubbing. Edema 1+, no evidence of DVT.    Fetal heart rate tracing review  Non-Stress Test:    Fetal Heart Rate Assessment   Method:     Beats/min:     Baseline:     Varibility:     Accels:     Decels:     Tracing Category:       Uterine Assessment   Method:     Frequency (min):     Ctx Count in 10 min:     Duration:     Intensity:     Intensity by IUPC:     Resting Tone:     Resting Tone by IUPC:     [place] Units:       Cervix: Exam by:     Dilation:     Effacement:     Station:       Ultrasound:  Twin intrauterine pregnancy in the transverse transverse presentations.  Diamniotic dichorionic.  Estimated fetal weight twin A 2436 g.  Twin B 1705 g.  Absent end-diastolic flow on umbilical Doppler studies for twin B    Labs:  Labs:  Lab Results   Component Value Date    WBC 8.11 10/18/2018    HGB 10.7 (L) 10/18/2018    HCT 33.1 (L) 10/18/2018    MCV 84.7 10/18/2018     10/18/2018    URICACID 6.1 10/18/2018    AST 19 10/18/2018    ALT 10 10/18/2018     10/18/2018       Results from last 7 days  Lab Units 10/18/18  6287   ABO TYPING  O   RH TYPING  Negative   ANTIBODY SCREEN  Positive        ASSESSMENT/PLAN:       28 y.o. female  at 34w0d with Preeclampsia, third trimester  1.  Twin intrauterine pregnancy transverse / transverse presentations,  diamniotic / dichorionic  2.  Hypothyroidism  3.  Obesity with a BMI of 35  4.  History of anxiety  5.  Abnormal umbilical artery Doppler studies for twin B with growth restriction for twin B    Plan  1.  Admit to labor and delivery  2.  Steroids for fetal lung maturity enhancement  3.  When necessary labetalol for blood pressure greater than 160/110   4.  Should the patient meet persistent severe criteria of preeclampsia, would advocate magnesium sulfate for antiseizure prophylaxis and proceeding with delivery.  Given the transverse / transverse presentations  delivery will be necessary  5.  NICU consultation given probable need for  delivery        Approximately 35 minutes floor time was spent in care of this patient, of which greater than 50% was spent in face-to-face consultation and coordination of care.    Jenaro Meyer MD     10/18/2018   6:08 PM        Electronically signed by Jenaro Meyer MD at 10/18/2018  6:14 PM             ICU Vital Signs     Row Name 10/19/18 0930 10/19/18 0900 10/19/18 0830 10/19/18 0738 10/19/18 0730       Vitals    Temp  --  --  -- 96.1 °F (35.6 °C)  --    Temp src  --  --  -- Axillary  --    Pulse 100 82 97  -- 95    Resp  -- 16  -- 16  --    Resp Rate Source  -- Visual  -- Visual  --    /91 126/68 161/96  -- 151/95    Row Name 10/19/18 0630 10/19/18 0530 10/19/18 0430 10/19/18 0330 10/19/18 0230       Vitals    Temp  --  --  -- 97.6 °F (36.4 °C)  --    Temp src  --  --  -- Oral  --    Pulse 91 88 88 100 89    Heart Rate Source  --  --  -- Monitor  --    Resp 16  18 16    Resp Rate Source  --  --  -- Visual  --    /91 132/77 134/83 103/65 115/66    BP Location  --  --  -- Right arm  --    BP Method  --  --  -- Automatic  --    Patient Position  --  --  -- Lying  --    Row Name  10/19/18 0153 10/19/18 0151 10/19/18 0027 10/19/18 0023 10/19/18 0019       Vitals    Pulse 90 90 86 85 88    Resp  -- 16  --  --  --    /85 134/85 121/66 117/65 130/70    Row Name 10/19/18 0016 10/19/18 0005 10/18/18 2345 10/18/18 2313 10/18/18 2303       Vitals    Temp  --  --  -- 97.6 °F (36.4 °C)  --    Temp src  --  --  -- Oral  --    Pulse 93 95 96 91 93    Resp  -- 16  -- 16  --    /80 141/95 178/97 152/95 161/100    Row Name 10/18/18 2248 10/18/18 2110 10/18/18 2004 10/18/18 1954 10/18/18 1945       Vitals    Pulse 100 98 100 101 97    Resp 16 16 16  --  --    /98 149/92 140/93 142/91 147/97    Row Name 10/18/18 1921 10/18/18 1859 10/18/18 1757 10/18/18 1753 10/18/18 1747       Vitals    Temp 98 °F (36.7 °C)  --  --  --  --    Temp src Oral  --  --  --  --    Pulse 100 108 120 102 102    Heart Rate Source Monitor  --  --  --  --    Resp 18 18  --  -- 18    Resp Rate Source Visual  --  --  -- Visual    BP (!)  166/101 138/88 150/88 151/94 143/93    BP Location Right arm   nurse notified of blood pressure  --  --  --  --    BP Method Automatic  --  --  --  --    Patient Position Lying  --  --  --  --    Row Name 10/18/18 1742 10/18/18 1741 10/18/18 1739 10/18/18 1737 10/18/18 1729       Vitals    Temp --  -- 99.3 °F (37.4 °C)  --  --    Temp src  --  -- Oral  --  --    Pulse 111 (!)  122  --  -- 111    Heart Rate Source  --  -- Monitor  --  --    Resp --  -- 18  --  --    Resp Rate Source --  -- Visual  --  --    /86 (!)  148/101  --  -- 151/94    BP Location  --  -- Right arm  --  --    BP Method  --  -- Automatic  --  --    Patient Position  --  -- Sitting  --  --       Patient Observation    Observations new bp cuff cord changed out  -- new red bp cuff applied new bp cuff applied readjusted bp cuff    Row Name 10/18/18 1728 10/18/18 1725 10/18/18 1712 10/18/18 1700 10/18/18 1556       Vitals    Temp  -- 98.7 °F (37.1 °C)  --  --  --    Temp src  -- Oral  --  --  --    Pulse  --  --  "105 100 106    Heart Rate Source  -- Monitor  --  --  --    Resp  -- 18  --  --  --    Resp Rate Source  -- Visual  --  --  --    BP  --  -- (!)  145/103 (!)  158/105 (!)  161/107    BP Location  -- Right arm  --  --  --    BP Method  -- Automatic  --  --  --    Patient Position  -- Sitting  --  --  --       Patient Observation    Observations bp cuff not working right  --  --  --  --    Row Name 10/18/18 1541 10/18/18 1526 10/18/18 1512 10/18/18 1327 10/18/18 1317       Height and Weight    Height  --  -- 163.8 cm (64.5\")  --  --    Weight  --  --  --  -- 95.2 kg (209 lb 12.8 oz)    Ideal Body Weight (IBW) (kg)  --  -- 56.15  --  --    Weight in (lb) to have BMI = 25  --  -- 147.6  --  --       Vitals    Temp  -- 98.1 °F (36.7 °C)  --  --  --    Temp src  -- Oral  --  --  --    Pulse 116 114  --  --  --    BP (!)  149/105 (!)  163/105  -- 146/98 149/100        Hospital Medications (all)       Dose Frequency Start End    betamethasone acetate-betamethasone sodium phosphate (CELESTONE SOLUSPAN) injection 12 mg 12 mg Every 24 Hours 10/18/2018 10/20/2018    Sig - Route: Inject 2 mL into the appropriate muscle as directed by prescriber Daily. - Intramuscular    dextrose 5 % and sodium chloride 0.2 % infusion 96 mL/hr Continuous 10/18/2018     Sig - Route: Infuse 96 mL/hr into a venous catheter Continuous. - Intravenous    famotidine (PEPCID) injection 20 mg 20 mg 2 Times Daily PRN 10/18/2018     Sig - Route: Infuse 2 mL into a venous catheter 2 (Two) Times a Day As Needed (heartburn). - Intravenous    guaifenesin-dextromethorphan (MUCINEX DM) tablet 1 tablet 1 tablet 2 Times Daily PRN 10/18/2018     Sig - Route: Take 1 tablet by mouth 2 (Two) Times a Day As Needed for Cough or Congestion. - Oral    labetalol (NORMODYNE,TRANDATE) injection 20-80 mg 20-80 mg Every 10 Minutes PRN 10/18/2018 10/19/2018    Sig - Route: Infuse 4-16 mL into a venous catheter Every 10 (Ten) Minutes As Needed for High Blood Pressure (See Admin " Instructions). - Intravenous    levothyroxine (SYNTHROID, LEVOTHROID) tablet 50 mcg 50 mcg Every Early Morning 10/19/2018     Sig - Route: Take 1 tablet by mouth Every Morning. - Oral    magnesium sulfate bolus from bag 0.07 g/mL solution 4 g 4 g Once 10/18/2018 10/18/2018    Sig - Route: Infuse 57.14 mL into a venous catheter 1 (One) Time. - Intravenous    Magnesium Sulfate-Lact Ringers 40 GM/580ML 2 g/hr Continuous 10/18/2018 10/21/2018    Sig - Route: Infuse 2 g/hr into a venous catheter Continuous. - Intravenous    prenatal vitamin 27-0.8 tablet 1 tablet 1 tablet Daily Before Supper 10/19/2018     Sig - Route: Take 1 tablet by mouth Daily Before Supper. - Oral    sodium chloride 0.9 % flush 3 mL 3 mL Every 12 Hours Scheduled 10/18/2018     Sig - Route: Infuse 3 mL into a venous catheter Every 12 (Twelve) Hours. - Intravenous    sodium chloride 0.9 % flush 5 mL 5 mL As Needed 10/18/2018     Sig - Route: Infuse 5 mL into a venous catheter As Needed for Line Care (After Medication Administration or Blood Draw). - Intravenous    prenatal vitamin 27-0.8 tablet 1 tablet (Discontinued) 1 tablet Daily 10/18/2018 10/19/2018    Sig - Route: Take 1 tablet by mouth Daily. - Oral            Lab Results (last 24 hours)     Procedure Component Value Units Date/Time    Urinalysis, Microscopic Only - Urine, Clean Catch [[id number]]  (Abnormal) Collected:  10/18/18 1648    Specimen:  Urine from Urine, Clean Catch Updated:  10/18/18 1752     RBC, UA 0-2 /HPF      WBC, UA 6-12 (A) /HPF      Bacteria, UA 1+ (A) /HPF      Squamous Epithelial Cells, UA 7-12 (A) /HPF      Hyaline Casts, UA 0-6 /LPF      Methodology Manual Light Microscopy    Urinalysis With Microscopic If Indicated (No Culture) - Urine, Clean Catch [[id number]]  (Abnormal) Collected:  10/18/18 1648    Specimen:  Urine from Urine, Clean Catch Updated:  10/18/18 1752     Color, UA Yellow     Appearance, UA Clear     pH, UA 5.5     Specific Gravity, UA 1.014     Glucose, UA  Negative     Ketones, UA Negative     Bilirubin, UA Negative     Blood, UA Negative     Protein,  mg/dL (2+) (A)     Leuk Esterase, UA Trace (A)     Nitrite, UA Negative     Urobilinogen, UA 0.2 E.U./dL    Creatinine Clearance - Urine, Clean Catch [[id number]] Collected:  10/18/18 1547    Specimen:  24 Hour Urine from Urine, Clean Catch Updated:  10/18/18 1639    Narrative:       The following orders were created for panel order Creatinine Clearance - Urine, Clean Catch.  Procedure                               Abnormality         Status                     ---------                               -----------         ------                     Creatinine clearance serum[[id number]]   Normal              Final result               Creatinine Clearance, Ur...[[id number]]                                                   Please view results for these tests on the individual orders.    Creatinine clearance serum [[id number]]  (Normal) Collected:  10/18/18 1547    Specimen:  Blood Updated:  10/18/18 1639     Creatinine 0.77 mg/dL      eGFR Non African Amer 89 mL/min/1.73     Narrative:       National Kidney Foundation Guidelines    Stage     Description        GFR  1         Normal or High     90+  2         Mild decrease      60-89  3         Moderate decrease  30-59  4         Severe decrease    15-29  5         Kidney failure     <15    The MDRD GFR formula is only valid for adults with stable renal function between ages 18 and 70.    Preeclampsia Panel [[id number]]  (Abnormal) Collected:  10/18/18 1547    Specimen:  Blood Updated:  10/18/18 1639     Alkaline Phosphatase 260 (H) U/L      ALT (SGPT) 10 U/L      AST (SGOT) 19 U/L      Creatinine 0.77 mg/dL      Total Bilirubin 0.2 (L) mg/dL       U/L      Uric Acid 6.1 mg/dL     CBC (No Diff) [[id number]]  (Abnormal) Collected:  10/18/18 1547    Specimen:  Blood Updated:  10/18/18 1619     WBC 8.11 10*3/mm3      RBC 3.91 10*6/mm3      Hemoglobin 10.7 (L) g/dL       Hematocrit 33.1 (L) %      MCV 84.7 fL      MCH 27.4 pg      MCHC 32.3 g/dL      RDW 14.2 %      RDW-SD 43.7 fl      MPV 12.0 fL      Platelets 161 10*3/mm3         Imaging Results (last 24 hours)     ** No results found for the last 24 hours. **           Physician Progress Notes (last 24 hours) (Notes from 10/18/2018 10:34 AM through 10/19/2018 10:34 AM)      Bari Hughes DO at 10/19/2018  8:49 AM          Daily Progress Note    Patient name: Yamilex Carty  YOB: 1990   MRN: [id number]  Admission Date: 10/18/2018  Date of Service: 10/19/2018  Referring Provider: Jen Kaplan DO    Yamilex Carty is a 28 y.o.    at 34w1d  admitted on 10/18/2018 for Preeclampsia, third trimester    Hospital day 1      Diagnoses:   Patient Active Problem List    Diagnosis   • *Preeclampsia, third trimester [O14.93]   • Pregnancy [Z34.90]   • Dichorionic diamniotic twin pregnancy in second trimester [O30.042]   • Pregnancy resulting from assisted reproductive technology in second trimester [O09.812]       Chief Complaint:  Chief Complaint   Patient presents with   • Headache   • fetal growth lag       Subjective:      Yamilex has c/o mild DOYLE today.  Reports fetal movement is normal  Denies leakage of amniotic fluid.  Denies vaginal bleeding    Objective:     Vital signs:  Temp:  [96.1 °F (35.6 °C)-99.3 °F (37.4 °C)] 96.1 °F (35.6 °C)  Heart Rate:  [] 97  Resp:  [14-18] 16  BP: (103-178)/() 161/96    Abdomen: soft, nontender  Uterus: gravid, nontender  Extremities: nontender; 1+ edema        Non-Stress Test:    Fetal Heart Rate Assessment   Method: Fetal HR Assessment Method: external   Beats/min: Fetal HR (beats/min): 135   Baseline: Fetal Heart Baseline Rate: normal range   Varibility: Fetal HR Variability: moderate (amplitude range 6 to 25 bpm)   Accels: Fetal HR Accelerations: greater than/equal to 15 bpm, lasting at least 15 seconds   Decels: Fetal HR Decelerations: absent  "  Tracing Category:       Uterine Assessment   Method: Method: external tocotransducer   Frequency (min):     Ctx Count in 10 min:     Duration:     Intensity: Contraction Intensity: no contractions   Intensity by IUPC:     Resting Tone:     Resting Tone by IUPC:     [place] Units:       Cervix: Exam by:     Dilation:     Effacement:     Station:         Medications:    betamethasone acetate-betamethasone sodium phosphate 12 mg Intramuscular Q24H   levothyroxine 50 mcg Oral Q AM   prenatal vitamin 27-0.8 1 tablet Oral Daily   sodium chloride 3 mL Intravenous Q12H      famotidine  •  guaifenesin-dextromethorphan  •  labetalol  •  sodium chloride    Labs:  [unfilled]  Lab Results   Component Value Date    HGB 10.7 (L) 10/18/2018         Assessment/Plan:      Yamilex is a 28 y.o.    at 34w1d Twins DI/DI ( IVF)  1. Preeclampsia, third trimester: severe AEDF  Twin b  2.  obesity  3. Fetal: NST reactive  TID  4. Delivery plan: primary C/S  Tomorrow once in steroid window or sooner  if disease progresses   NPO after MN  .  All questions were answered to the best my ability.    Bari Hughes DO  10/19/2018      Electronically signed by Bari Hughes DO at 10/19/2018  8:54 AM     Milligan, Douglas A, MD at 10/19/2018  8:31 AM          [facility]  Obstetric Progress Note    Chief Complaint:  Chief Complaint   Patient presents with   • Headache   • fetal growth lag       Subjective     Patient:    The patient feels well.      Objective     Vital Signs Range for the last 24 hours  Temp:  [96.1 °F (35.6 °C)-99.3 °F (37.4 °C)] 96.1 °F (35.6 °C)   Temp src: Axillary   BP: (103-178)/() 151/95   Heart Rate:  [] 95   Resp:  [14-18] 16               Weight:  [95.2 kg (209 lb 12.8 oz)] 95.2 kg (209 lb 12.8 oz)       Flowsheet Rows      First Filed Value   Admission Height  163.8 cm (64.5\") Documented at 10/18/2018 1512   Admission Weight  --          Intake/Output last 24 hours:      Intake/Output " Summary (Last 24 hours) at 10/19/18 0831  Last data filed at 10/19/18 0810   Gross per 24 hour   Intake             1500 ml   Output             5050 ml   Net            -3550 ml       Intake/Output this shift:    I/O this shift:  In: -   Out: 800 [Urine:800]    Physical Exam:  General: Patient is comfortable   Heart CVS exam: normal rate and regular rhythm.   Lungs Chest: no tachypnea, retractions or cyanosis.     Abdomen Abdominal exam: uterus is nontender   Extremities Exam of extremities: not examined     Presentation:    Cervix: Exam by:     Dilation:     Effacement:     Station:           Fetal Heart Rate Assessment   Method:     Beats/min:     Baseline:     Varibility:     Accels:     Decels:     Tracing Category:       Uterine Assessment   Method:     Frequency (min):     Ctx Count in 10 min:     Duration:     Intensity:     Intensity by IUPC:     Resting Tone:     Resting Tone by IUPC:     [place] Units:         Assessment/Plan       Preeclampsia, third trimester    Dichorionic diamniotic twin pregnancy in second trimester    Pregnancy resulting from assisted reproductive technology in second trimester    Pregnancy        Assessment:  1.  Intrauterine pregnancy at 34w1d weeks gestation with reactive fetal status.    2.  Preeclampsia  3.  Obstetrical history significant for is non-contributory.  4.  GBS status: No results found for: GBSANTIGEN    Plan:  1. fetal and uterine monitoring  intermittent   With severe preeclampsia and AEDF, recommend delivery in steroid window  2. Plan of care has been reviewed with patient.  3.  Risks, benefits of treatment plan have been discussed.  4.  All questions have been answered.  5.      Douglas A. Milligan, MD  10/19/2018  8:31 AM      Electronically signed by Milligan, Douglas A, MD at 10/19/2018  8:37 AM       Consult Notes (last 24 hours) (Notes from 10/18/2018 10:34 AM through 10/19/2018 10:34 AM)     No notes of this type exist for this encounter.

## 2018-10-19 NOTE — CONSULTS
PRENATAL CONTACT - NDRT    Requested by OB to meet with parents to update them with delivery and admission procedures for their infant.    Present:mother and father    Pertinent Prenatal Information:    Gestational Age: 32 4  /7 weeks  US report: twin b AEDF IUGR  Other: TWINS      The following issues were discussed:    1) Admission Procedure.  2) NICU Visitation Policy.  3) Sibling Visitation Policy NA  4) Skin to Skin Care ([facility]).  5) Hand Expression for Breast Milk soon after birth.  6) Breast Feeding/Expressing Breast Milk.  7) Tour of NICU offered.father  8) Other Issues Discussed:       Concerns Voiced by Parents: none        Pamela Ortega, RN    10/19/2018   10:25 AM

## 2018-10-19 NOTE — PROGRESS NOTES
Daily Progress Note    Patient name: Yamilex Carty  YOB: 1990   MRN: [id number]  Admission Date: 10/18/2018  Date of Service: 10/19/2018  Referring Provider: Jen Kaplan DO    Yamilex Carty is a 28 y.o.    at 34w1d  admitted on 10/18/2018 for Preeclampsia, third trimester    Hospital day 1      Diagnoses:   Patient Active Problem List    Diagnosis   • *Preeclampsia, third trimester [O14.93]   • Pregnancy [Z34.90]   • Dichorionic diamniotic twin pregnancy in second trimester [O30.042]   • Pregnancy resulting from assisted reproductive technology in second trimester [O09.812]       Chief Complaint:  Chief Complaint   Patient presents with   • Headache   • fetal growth lag       Subjective:      Yamilex has c/o mild DOYLE today.  Reports fetal movement is normal  Denies leakage of amniotic fluid.  Denies vaginal bleeding    Objective:     Vital signs:  Temp:  [96.1 °F (35.6 °C)-99.3 °F (37.4 °C)] 96.1 °F (35.6 °C)  Heart Rate:  [] 97  Resp:  [14-18] 16  BP: (103-178)/() 161/96    Abdomen: soft, nontender  Uterus: gravid, nontender  Extremities: nontender; 1+ edema        Non-Stress Test:    Fetal Heart Rate Assessment   Method: Fetal HR Assessment Method: external   Beats/min: Fetal HR (beats/min): 135   Baseline: Fetal Heart Baseline Rate: normal range   Varibility: Fetal HR Variability: moderate (amplitude range 6 to 25 bpm)   Accels: Fetal HR Accelerations: greater than/equal to 15 bpm, lasting at least 15 seconds   Decels: Fetal HR Decelerations: absent   Tracing Category:       Uterine Assessment   Method: Method: external tocotransducer   Frequency (min):     Ctx Count in 10 min:     Duration:     Intensity: Contraction Intensity: no contractions   Intensity by IUPC:     Resting Tone:     Resting Tone by IUPC:     Bellflower Units:       Cervix: Exam by:     Dilation:     Effacement:     Station:         Medications:    betamethasone acetate-betamethasone sodium  phosphate 12 mg Intramuscular Q24H   levothyroxine 50 mcg Oral Q AM   prenatal vitamin 27-0.8 1 tablet Oral Daily   sodium chloride 3 mL Intravenous Q12H      famotidine  •  guaifenesin-dextromethorphan  •  labetalol  •  sodium chloride    Labs:  [unfilled]  Lab Results   Component Value Date    HGB 10.7 (L) 10/18/2018         Assessment/Plan:      Yamilex is a 28 y.o.    at 34w1d Twins DI/DI ( IVF)  1. Preeclampsia, third trimester: severe AEDF  Twin b  2.  obesity  3. Fetal: NST reactive  TID  4. Delivery plan: primary C/S  Tomorrow once in steroid window or sooner  if disease progresses   NPO after MN  .  All questions were answered to the best my ability.    Bari Hughes,   10/19/2018

## 2018-10-20 ENCOUNTER — ANESTHESIA (OUTPATIENT)
Dept: LABOR AND DELIVERY | Facility: HOSPITAL | Age: 28
End: 2018-10-20

## 2018-10-20 ENCOUNTER — ANESTHESIA EVENT (OUTPATIENT)
Dept: LABOR AND DELIVERY | Facility: HOSPITAL | Age: 28
End: 2018-10-20

## 2018-10-20 LAB
ALP SERPL-CCNC: 269 U/L (ref 25–100)
ALT SERPL W P-5'-P-CCNC: 17 U/L (ref 7–40)
ARTERIAL PATENCY WRIST A: ABNORMAL
ARTERIAL PATENCY WRIST A: ABNORMAL
AST SERPL-CCNC: 29 U/L (ref 0–33)
ATMOSPHERIC PRESS: ABNORMAL MMHG
ATMOSPHERIC PRESS: ABNORMAL MMHG
BASE EXCESS BLDA CALC-SCNC: -2.5 MMOL/L (ref 0–2)
BASE EXCESS BLDA CALC-SCNC: -2.6 MMOL/L (ref 0–2)
BASE EXCESS BLDCOA CALC-SCNC: -11 MMOL/L (ref 0–2)
BASE EXCESS BLDCOA CALC-SCNC: -11.5 MMOL/L (ref 0–2)
BDY SITE: ABNORMAL
BILIRUB SERPL-MCNC: 0.2 MG/DL (ref 0.3–1.2)
BODY TEMPERATURE: 37 C
COHGB MFR BLD: 1.2 % (ref 0–2)
COHGB MFR BLD: 1.2 % (ref 0–2)
COLLECT DURATION TIME UR: 24 HRS
COLLECT DURATION TIME UR: 24 HRS
CREAT BLD-MCNC: 0.75 MG/DL (ref 0.6–1.3)
CREAT CL 24H UR+SERPL-VRATE: 120.4 L/24 HR (ref 144–259.2)
CREAT CL 24H UR+SERPL-VRATE: 83.6 ML/MIN (ref 100–180)
CREAT UR-MCNC: 19.8 MG/DL
CREATINE 24H UR-MRATE: 1.05 G/24 HR (ref 0.6–1.8)
DEPRECATED RDW RBC AUTO: 45.8 FL (ref 37–54)
ERYTHROCYTE [DISTWIDTH] IN BLOOD BY AUTOMATED COUNT: 14.5 % (ref 11.3–14.5)
HCO3 BLDA-SCNC: 23.7 MMOL/L (ref 20–26)
HCO3 BLDA-SCNC: 25.4 MMOL/L (ref 20–26)
HCO3 BLDCOA-SCNC: 18.8 MMOL/L (ref 16.9–20.5)
HCO3 BLDCOA-SCNC: 19.2 MMOL/L (ref 16.9–20.5)
HCT VFR BLD AUTO: 35.2 % (ref 34.5–44)
HCT VFR BLD CALC: 51.2 %
HCT VFR BLD CALC: 52.2 %
HGB BLD-MCNC: 11.1 G/DL (ref 11.5–15.5)
HGB BLDA-MCNC: 13.9 G/DL (ref 14–18)
HGB BLDA-MCNC: 14.7 G/DL (ref 14–18)
HGB BLDA-MCNC: 16.7 G/DL (ref 14–18)
HGB BLDA-MCNC: 17 G/DL (ref 14–18)
HOROWITZ INDEX BLD+IHG-RTO: 21 %
LDH SERPL-CCNC: 320 U/L (ref 120–246)
Lab: ABNORMAL
MCH RBC QN AUTO: 27.5 PG (ref 27–31)
MCHC RBC AUTO-ENTMCNC: 31.5 G/DL (ref 32–36)
MCV RBC AUTO: 87.1 FL (ref 80–99)
METHGB BLD QL: 1.5 % (ref 0–1.5)
METHGB BLD QL: 2 % (ref 0–1.5)
MODALITY: ABNORMAL
MODALITY: ABNORMAL
NOTE: ABNORMAL
NOTIFIED BY: ABNORMAL
NOTIFIED WHO: ABNORMAL
OXYHGB MFR BLDV: 21.4 % (ref 94–99)
OXYHGB MFR BLDV: 52 % (ref 94–99)
PCO2 BLDA: 44.9 MM HG (ref 35–45)
PCO2 BLDA: 54.8 MM HG (ref 35–45)
PCO2 BLDCOA: 60.2 MMHG (ref 43.3–54.9)
PCO2 BLDCOA: 60.6 MMHG (ref 43.3–54.9)
PCO2 TEMP ADJ BLD: 44.9 MM HG (ref 35–45)
PCO2 TEMP ADJ BLD: 54.8 MM HG (ref 35–45)
PH BLDA: 7.27 PH UNITS (ref 7.35–7.45)
PH BLDA: 7.33 PH UNITS (ref 7.35–7.45)
PH BLDCOA: 7.1 PH UNITS (ref 7.22–7.3)
PH BLDCOA: 7.11 PH UNITS (ref 7.22–7.3)
PH, TEMP CORRECTED: 7.27 PH UNITS
PH, TEMP CORRECTED: 7.33 PH UNITS
PLATELET # BLD AUTO: 193 10*3/MM3 (ref 150–450)
PMV BLD AUTO: 12 FL (ref 6–12)
PO2 BLDA: 14.6 MM HG (ref 83–108)
PO2 BLDA: 24.9 MM HG (ref 83–108)
PO2 BLDCOA: 11.3 MMHG (ref 11.5–43.3)
PO2 BLDCOA: 12.2 MMHG (ref 11.5–43.3)
PO2 TEMP ADJ BLD: 14.6 MM HG (ref 83–108)
PO2 TEMP ADJ BLD: 24.9 MM HG (ref 83–108)
PROT 24H UR-MRATE: 954 MG/24HOURS (ref 42–225)
PROT UR-MCNC: 18 MG/DL (ref 1–14)
RBC # BLD AUTO: 4.04 10*6/MM3 (ref 3.89–5.14)
SAO2 % BLDCOA: 14.4 %
SPECIMEN VOL 24H UR: 5300 ML
URATE SERPL-MCNC: 6.1 MG/DL (ref 3.1–7.8)
WBC NRBC COR # BLD: 10.95 10*3/MM3 (ref 3.5–10.8)

## 2018-10-20 PROCEDURE — 25010000003 CEFAZOLIN IN DEXTROSE 2-4 GM/100ML-% SOLUTION: Performed by: OBSTETRICS & GYNECOLOGY

## 2018-10-20 PROCEDURE — 88307 TISSUE EXAM BY PATHOLOGIST: CPT | Performed by: OBSTETRICS & GYNECOLOGY

## 2018-10-20 PROCEDURE — 84075 ASSAY ALKALINE PHOSPHATASE: CPT | Performed by: OBSTETRICS & GYNECOLOGY

## 2018-10-20 PROCEDURE — 59514 CESAREAN DELIVERY ONLY: CPT | Performed by: OBSTETRICS & GYNECOLOGY

## 2018-10-20 PROCEDURE — 25010000002 METOCLOPRAMIDE PER 10 MG: Performed by: ANESTHESIOLOGY

## 2018-10-20 PROCEDURE — 82805 BLOOD GASES W/O2 SATURATION: CPT

## 2018-10-20 PROCEDURE — 84460 ALANINE AMINO (ALT) (SGPT): CPT | Performed by: OBSTETRICS & GYNECOLOGY

## 2018-10-20 PROCEDURE — 25010000002 KETOROLAC TROMETHAMINE PER 15 MG: Performed by: ANESTHESIOLOGY

## 2018-10-20 PROCEDURE — 25010000002 NALOXONE PER 1 MG: Performed by: ANESTHESIOLOGY

## 2018-10-20 PROCEDURE — 84550 ASSAY OF BLOOD/URIC ACID: CPT | Performed by: OBSTETRICS & GYNECOLOGY

## 2018-10-20 PROCEDURE — 82565 ASSAY OF CREATININE: CPT | Performed by: OBSTETRICS & GYNECOLOGY

## 2018-10-20 PROCEDURE — 59025 FETAL NON-STRESS TEST: CPT

## 2018-10-20 PROCEDURE — 36600 WITHDRAWAL OF ARTERIAL BLOOD: CPT

## 2018-10-20 PROCEDURE — 25010000002 MAGNESIUM SULFATE-LACT RINGERS 40 GM/580ML SOLUTION: Performed by: OBSTETRICS & GYNECOLOGY

## 2018-10-20 PROCEDURE — 85027 COMPLETE CBC AUTOMATED: CPT | Performed by: OBSTETRICS & GYNECOLOGY

## 2018-10-20 PROCEDURE — 82247 BILIRUBIN TOTAL: CPT | Performed by: OBSTETRICS & GYNECOLOGY

## 2018-10-20 PROCEDURE — 25010000002 NALBUPHINE PER 10 MG: Performed by: ANESTHESIOLOGY

## 2018-10-20 PROCEDURE — 84450 TRANSFERASE (AST) (SGOT): CPT | Performed by: OBSTETRICS & GYNECOLOGY

## 2018-10-20 PROCEDURE — 25010000002 ONDANSETRON PER 1 MG: Performed by: ANESTHESIOLOGY

## 2018-10-20 PROCEDURE — 25010000002 FENTANYL CITRATE (PF) 100 MCG/2ML SOLUTION: Performed by: ANESTHESIOLOGY

## 2018-10-20 PROCEDURE — 83615 LACTATE (LD) (LDH) ENZYME: CPT | Performed by: OBSTETRICS & GYNECOLOGY

## 2018-10-20 PROCEDURE — 25010000003 MORPHINE PER 10 MG: Performed by: ANESTHESIOLOGY

## 2018-10-20 RX ORDER — OXYCODONE HYDROCHLORIDE AND ACETAMINOPHEN 5; 325 MG/1; MG/1
1 TABLET ORAL EVERY 4 HOURS PRN
Status: DISCONTINUED | OUTPATIENT
Start: 2018-10-20 | End: 2018-10-24 | Stop reason: HOSPADM

## 2018-10-20 RX ORDER — METOCLOPRAMIDE HYDROCHLORIDE 5 MG/ML
INJECTION INTRAMUSCULAR; INTRAVENOUS AS NEEDED
Status: DISCONTINUED | OUTPATIENT
Start: 2018-10-20 | End: 2018-10-20 | Stop reason: SURG

## 2018-10-20 RX ORDER — LABETALOL HYDROCHLORIDE 5 MG/ML
INJECTION, SOLUTION INTRAVENOUS
Status: DISPENSED
Start: 2018-10-20 | End: 2018-10-20

## 2018-10-20 RX ORDER — SIMETHICONE 80 MG
80 TABLET,CHEWABLE ORAL 4 TIMES DAILY PRN
Status: DISCONTINUED | OUTPATIENT
Start: 2018-10-20 | End: 2018-10-24 | Stop reason: HOSPADM

## 2018-10-20 RX ORDER — METOCLOPRAMIDE HYDROCHLORIDE 5 MG/ML
10 INJECTION INTRAMUSCULAR; INTRAVENOUS ONCE AS NEEDED
Status: DISCONTINUED | OUTPATIENT
Start: 2018-10-20 | End: 2018-10-24 | Stop reason: HOSPADM

## 2018-10-20 RX ORDER — OXYCODONE AND ACETAMINOPHEN 10; 325 MG/1; MG/1
1 TABLET ORAL EVERY 4 HOURS PRN
Status: DISCONTINUED | OUTPATIENT
Start: 2018-10-20 | End: 2018-10-24 | Stop reason: HOSPADM

## 2018-10-20 RX ORDER — OXYTOCIN-SODIUM CHLORIDE 0.9% IV SOLN 30 UNIT/500ML 30-0.9/5 UT/ML-%
650 SOLUTION INTRAVENOUS ONCE
Status: DISCONTINUED | OUTPATIENT
Start: 2018-10-20 | End: 2018-10-24 | Stop reason: HOSPADM

## 2018-10-20 RX ORDER — NALOXONE HYDROCHLORIDE 0.4 MG/ML
INJECTION, SOLUTION INTRAMUSCULAR; INTRAVENOUS; SUBCUTANEOUS AS NEEDED
Status: DISCONTINUED | OUTPATIENT
Start: 2018-10-20 | End: 2018-10-20 | Stop reason: SURG

## 2018-10-20 RX ORDER — FAMOTIDINE 10 MG/ML
INJECTION, SOLUTION INTRAVENOUS AS NEEDED
Status: DISCONTINUED | OUTPATIENT
Start: 2018-10-20 | End: 2018-10-20 | Stop reason: SURG

## 2018-10-20 RX ORDER — METHYLERGONOVINE MALEATE 0.2 MG/ML
200 INJECTION INTRAVENOUS ONCE AS NEEDED
Status: DISCONTINUED | OUTPATIENT
Start: 2018-10-20 | End: 2018-10-24 | Stop reason: HOSPADM

## 2018-10-20 RX ORDER — LABETALOL HYDROCHLORIDE 5 MG/ML
20 INJECTION, SOLUTION INTRAVENOUS ONCE
Status: DISCONTINUED | OUTPATIENT
Start: 2018-10-20 | End: 2018-10-20

## 2018-10-20 RX ORDER — TRISODIUM CITRATE DIHYDRATE AND CITRIC ACID MONOHYDRATE 500; 334 MG/5ML; MG/5ML
30 SOLUTION ORAL ONCE
Status: COMPLETED | OUTPATIENT
Start: 2018-10-20 | End: 2018-10-20

## 2018-10-20 RX ORDER — BUPIVACAINE HYDROCHLORIDE 5 MG/ML
INJECTION, SOLUTION EPIDURAL; INTRACAUDAL AS NEEDED
Status: DISCONTINUED | OUTPATIENT
Start: 2018-10-20 | End: 2018-10-20 | Stop reason: SURG

## 2018-10-20 RX ORDER — CEFAZOLIN SODIUM 2 G/100ML
2 INJECTION, SOLUTION INTRAVENOUS ONCE
Status: COMPLETED | OUTPATIENT
Start: 2018-10-20 | End: 2018-10-20

## 2018-10-20 RX ORDER — ACETAMINOPHEN 325 MG/1
650 TABLET ORAL EVERY 6 HOURS PRN
Status: DISCONTINUED | OUTPATIENT
Start: 2018-10-20 | End: 2018-10-23

## 2018-10-20 RX ORDER — NALBUPHINE HYDROCHLORIDE 20 MG/ML
2.5 INJECTION, SOLUTION INTRAMUSCULAR; INTRAVENOUS; SUBCUTANEOUS EVERY 4 HOURS PRN
Status: DISPENSED | OUTPATIENT
Start: 2018-10-20 | End: 2018-10-21

## 2018-10-20 RX ORDER — MORPHINE SULFATE 0.5 MG/ML
INJECTION, SOLUTION EPIDURAL; INTRATHECAL; INTRAVENOUS AS NEEDED
Status: DISCONTINUED | OUTPATIENT
Start: 2018-10-20 | End: 2018-10-20 | Stop reason: SURG

## 2018-10-20 RX ORDER — ACETAMINOPHEN 325 MG/1
650 TABLET ORAL ONCE AS NEEDED
Status: COMPLETED | OUTPATIENT
Start: 2018-10-20 | End: 2018-10-20

## 2018-10-20 RX ORDER — OXYTOCIN-SODIUM CHLORIDE 0.9% IV SOLN 30 UNIT/500ML 30-0.9/5 UT/ML-%
85 SOLUTION INTRAVENOUS ONCE
Status: DISCONTINUED | OUTPATIENT
Start: 2018-10-20 | End: 2018-10-24 | Stop reason: HOSPADM

## 2018-10-20 RX ORDER — MISOPROSTOL 200 UG/1
800 TABLET ORAL AS NEEDED
Status: DISCONTINUED | OUTPATIENT
Start: 2018-10-20 | End: 2018-10-24 | Stop reason: HOSPADM

## 2018-10-20 RX ORDER — OXYTOCIN 10 [USP'U]/ML
INJECTION, SOLUTION INTRAMUSCULAR; INTRAVENOUS AS NEEDED
Status: DISCONTINUED | OUTPATIENT
Start: 2018-10-20 | End: 2018-10-20 | Stop reason: SURG

## 2018-10-20 RX ORDER — SODIUM CHLORIDE, SODIUM LACTATE, POTASSIUM CHLORIDE, CALCIUM CHLORIDE 600; 310; 30; 20 MG/100ML; MG/100ML; MG/100ML; MG/100ML
INJECTION, SOLUTION INTRAVENOUS CONTINUOUS PRN
Status: DISCONTINUED | OUTPATIENT
Start: 2018-10-20 | End: 2018-10-20 | Stop reason: SURG

## 2018-10-20 RX ORDER — ONDANSETRON 2 MG/ML
4 INJECTION INTRAMUSCULAR; INTRAVENOUS ONCE
Status: DISCONTINUED | OUTPATIENT
Start: 2018-10-20 | End: 2018-10-24 | Stop reason: HOSPADM

## 2018-10-20 RX ORDER — ONDANSETRON 2 MG/ML
4 INJECTION INTRAMUSCULAR; INTRAVENOUS EVERY 6 HOURS PRN
Status: DISCONTINUED | OUTPATIENT
Start: 2018-10-20 | End: 2018-10-23

## 2018-10-20 RX ORDER — CARBOPROST TROMETHAMINE 250 UG/ML
250 INJECTION, SOLUTION INTRAMUSCULAR AS NEEDED
Status: DISCONTINUED | OUTPATIENT
Start: 2018-10-20 | End: 2018-10-23

## 2018-10-20 RX ORDER — SODIUM CHLORIDE, SODIUM LACTATE, POTASSIUM CHLORIDE, CALCIUM CHLORIDE 600; 310; 30; 20 MG/100ML; MG/100ML; MG/100ML; MG/100ML
125 INJECTION, SOLUTION INTRAVENOUS CONTINUOUS
Status: DISCONTINUED | OUTPATIENT
Start: 2018-10-20 | End: 2018-10-20

## 2018-10-20 RX ORDER — DOCUSATE SODIUM 100 MG/1
100 CAPSULE, LIQUID FILLED ORAL 2 TIMES DAILY PRN
Status: DISCONTINUED | OUTPATIENT
Start: 2018-10-20 | End: 2018-10-24 | Stop reason: HOSPADM

## 2018-10-20 RX ORDER — IBUPROFEN 600 MG/1
600 TABLET ORAL ONCE AS NEEDED
Status: DISCONTINUED | OUTPATIENT
Start: 2018-10-20 | End: 2018-10-23

## 2018-10-20 RX ORDER — ONDANSETRON 2 MG/ML
INJECTION INTRAMUSCULAR; INTRAVENOUS AS NEEDED
Status: DISCONTINUED | OUTPATIENT
Start: 2018-10-20 | End: 2018-10-20 | Stop reason: SURG

## 2018-10-20 RX ORDER — FENTANYL CITRATE 50 UG/ML
INJECTION, SOLUTION INTRAMUSCULAR; INTRAVENOUS AS NEEDED
Status: DISCONTINUED | OUTPATIENT
Start: 2018-10-20 | End: 2018-10-20 | Stop reason: SURG

## 2018-10-20 RX ORDER — LABETALOL HYDROCHLORIDE 5 MG/ML
20-80 INJECTION, SOLUTION INTRAVENOUS
Status: DISCONTINUED | OUTPATIENT
Start: 2018-10-20 | End: 2018-10-20

## 2018-10-20 RX ORDER — HYDROMORPHONE HYDROCHLORIDE 1 MG/ML
0.5 INJECTION, SOLUTION INTRAMUSCULAR; INTRAVENOUS; SUBCUTANEOUS
Status: ACTIVE | OUTPATIENT
Start: 2018-10-20 | End: 2018-10-21

## 2018-10-20 RX ORDER — KETOROLAC TROMETHAMINE 30 MG/ML
30 INJECTION, SOLUTION INTRAMUSCULAR; INTRAVENOUS ONCE
Status: COMPLETED | OUTPATIENT
Start: 2018-10-20 | End: 2018-10-20

## 2018-10-20 RX ADMIN — EPHEDRINE SULFATE 10 MG: 50 INJECTION INTRAMUSCULAR; INTRAVENOUS; SUBCUTANEOUS at 19:02

## 2018-10-20 RX ADMIN — EPHEDRINE SULFATE 10 MG: 50 INJECTION INTRAMUSCULAR; INTRAVENOUS; SUBCUTANEOUS at 19:10

## 2018-10-20 RX ADMIN — CEFAZOLIN SODIUM 2 G: 2 INJECTION, SOLUTION INTRAVENOUS at 18:28

## 2018-10-20 RX ADMIN — DEXTROSE AND SODIUM CHLORIDE 96 ML/HR: 5; 200 INJECTION, SOLUTION INTRAVENOUS at 14:31

## 2018-10-20 RX ADMIN — SODIUM CHLORIDE, POTASSIUM CHLORIDE, SODIUM LACTATE AND CALCIUM CHLORIDE: 600; 310; 30; 20 INJECTION, SOLUTION INTRAVENOUS at 18:34

## 2018-10-20 RX ADMIN — DEXTROSE AND SODIUM CHLORIDE 96 ML/HR: 5; 200 INJECTION, SOLUTION INTRAVENOUS at 22:19

## 2018-10-20 RX ADMIN — NALBUPHINE HYDROCHLORIDE 2.6 MG: 20 INJECTION, SOLUTION INTRAMUSCULAR; INTRAVENOUS; SUBCUTANEOUS at 23:10

## 2018-10-20 RX ADMIN — ONDANSETRON 4 MG: 2 INJECTION INTRAMUSCULAR; INTRAVENOUS at 18:39

## 2018-10-20 RX ADMIN — LABETALOL HYDROCHLORIDE 20 MG: 5 INJECTION INTRAVENOUS at 14:33

## 2018-10-20 RX ADMIN — ACETAMINOPHEN 650 MG: 325 TABLET ORAL at 23:00

## 2018-10-20 RX ADMIN — KETOROLAC TROMETHAMINE 30 MG: 30 INJECTION, SOLUTION INTRAMUSCULAR at 22:09

## 2018-10-20 RX ADMIN — DEXTROSE AND SODIUM CHLORIDE 96 ML/HR: 5; 200 INJECTION, SOLUTION INTRAVENOUS at 03:06

## 2018-10-20 RX ADMIN — ACETAMINOPHEN 650 MG: 325 TABLET ORAL at 05:23

## 2018-10-20 RX ADMIN — MORPHINE SULFATE 0.2 MG: 0.5 INJECTION, SOLUTION EPIDURAL; INTRATHECAL; INTRAVENOUS at 18:52

## 2018-10-20 RX ADMIN — NALOXONE HYDROCHLORIDE 0.4 MG: 0.4 INJECTION, SOLUTION INTRAMUSCULAR; INTRAVENOUS; SUBCUTANEOUS at 19:38

## 2018-10-20 RX ADMIN — LEVOTHYROXINE SODIUM 50 MCG: 0.05 TABLET ORAL at 05:24

## 2018-10-20 RX ADMIN — OXYTOCIN 10 UNITS: 10 INJECTION, SOLUTION INTRAMUSCULAR; INTRAVENOUS at 19:05

## 2018-10-20 RX ADMIN — FENTANYL CITRATE 20 MCG: 50 INJECTION, SOLUTION INTRAMUSCULAR; INTRAVENOUS at 18:52

## 2018-10-20 RX ADMIN — SODIUM CITRATE AND CITRIC ACID MONOHYDRATE 30 ML: 500; 334 SOLUTION ORAL at 18:28

## 2018-10-20 RX ADMIN — FENTANYL CITRATE 80 MCG: 50 INJECTION, SOLUTION INTRAMUSCULAR; INTRAVENOUS at 19:24

## 2018-10-20 RX ADMIN — METOCLOPRAMIDE 10 MG: 5 INJECTION, SOLUTION INTRAMUSCULAR; INTRAVENOUS at 18:39

## 2018-10-20 RX ADMIN — Medication 2 G/HR: at 03:07

## 2018-10-20 RX ADMIN — BUPIVACAINE HYDROCHLORIDE 2.4 ML: 5 INJECTION, SOLUTION EPIDURAL; INTRACAUDAL; PERINEURAL at 18:52

## 2018-10-20 RX ADMIN — OXYTOCIN 20 UNITS: 10 INJECTION, SOLUTION INTRAMUSCULAR; INTRAVENOUS at 19:30

## 2018-10-20 RX ADMIN — SODIUM CHLORIDE, POTASSIUM CHLORIDE, SODIUM LACTATE AND CALCIUM CHLORIDE 125 ML/HR: 600; 310; 30; 20 INJECTION, SOLUTION INTRAVENOUS at 17:07

## 2018-10-20 RX ADMIN — FAMOTIDINE 20 MG: 10 INJECTION, SOLUTION INTRAVENOUS at 18:39

## 2018-10-20 RX ADMIN — OXYTOCIN 30 UNITS: 10 INJECTION, SOLUTION INTRAMUSCULAR; INTRAVENOUS at 19:15

## 2018-10-20 RX ADMIN — EPHEDRINE SULFATE 25 MG: 50 INJECTION INTRAMUSCULAR; INTRAVENOUS; SUBCUTANEOUS at 18:54

## 2018-10-20 NOTE — ANESTHESIA PROCEDURE NOTES
Spinal Block      Patient reassessed immediately prior to procedure    Patient location during procedure: OB  Performed By  Anesthesiologist: CLARIBEL BEGUM  Preanesthetic Checklist  Completed: patient identified, site marked, surgical consent, pre-op evaluation, timeout performed, IV checked, risks and benefits discussed and monitors and equipment checked  Spinal Block Prep:  Patient Position:sitting  Sterile Tech:cap, gloves, mask and sterile barriers  Prep:Chloraprep  Patient Monitoring:blood pressure monitoring, continuous pulse oximetry and EKG  Spinal Block Procedure  Approach:midline  Guidance:landmark technique and palpation technique  Location:L3-L4  Needle Type:Maryse  Needle Gauge:25 G  Placement of Spinal needle event:cerebrospinal fluid aspirated  Paresthesia: no  Fluid Appearance:clear  Post Assessment  Patient Tolerance:patient tolerated the procedure well with no apparent complications  Complications no

## 2018-10-20 NOTE — ANESTHESIA PREPROCEDURE EVALUATION
Anesthesia Evaluation     Patient summary reviewed and Nursing notes reviewed   NPO Solid Status: > 6 hours  NPO Liquid Status: > 6 hours           Airway   Mallampati: II  TM distance: >3 FB  Neck ROM: full  No difficulty expected  Dental      Pulmonary - negative pulmonary ROS   Cardiovascular - negative cardio ROS        Neuro/Psych- negative ROS  GI/Hepatic/Renal/Endo    (+)   hypothyroidism,     Musculoskeletal (-) negative ROS    Abdominal    Substance History - negative use     OB/GYN    (+) Pregnant, Preeclampsia,         Other                        Anesthesia Plan    ASA 3     ITN and spinal     Anesthetic plan, all risks, benefits, and alternatives have been provided, discussed and informed consent has been obtained with: patient.

## 2018-10-20 NOTE — PLAN OF CARE
Problem: Hypertensive Disorders in Pregnancy (Adult,Obstetrics,Pediatric)  Goal: Signs and Symptoms of Listed Potential Problems Will be Absent, Minimized or Managed (Hypertensive Disorders in Pregnancy)  Outcome: Ongoing (interventions implemented as appropriate)   10/20/18 5711   Goal/Outcome Evaluation   Problems Assessed (Hypertensive Disorders in Pregnancy) all   Problems Present (Hypertensive/in PG) none

## 2018-10-20 NOTE — ANESTHESIA POSTPROCEDURE EVALUATION
Patient: Yamilex Carty    Procedure Summary     Date:  10/20/18 Room / Location:  [facility] LABOR DELIVERY   [facility] LABOR DELIVERY    Anesthesia Start:   Anesthesia Stop:      Procedure:   SECTION PRIMARY (N/A Abdomen) Diagnosis:      Surgeon:  Brai Hughes DO Provider:  Marcus Gross DO    Anesthesia Type:  ITN, spinal ASA Status:  3          Anesthesia Type: ITN, spinal  Last vitals  BP   127/79   Temp   97.6 °F (36.4 °C) (10/20/18 1944)   Pulse   73   Resp   16   SpO2    97%     Post Anesthesia Care and Evaluation    Patient location during evaluation: bedside  Patient participation: complete - patient participated  Level of consciousness: awake  Pain score: 0  Pain management: satisfactory to patient  Airway patency: patent  Anesthetic complications: No anesthetic complications  PONV Status: none  Cardiovascular status: acceptable and hemodynamically stable  Respiratory status: acceptable  Hydration status: acceptable

## 2018-10-20 NOTE — OP NOTE
Operative Note    Patient name: Yamilex Carty  YOB: 1990   MRN: [id number]  Admission Date: 10/18/2018  Referring Provider: Jen Kaplan DO    ID: 28 y.o.  at 34w2d    Preoperative Diagnosis:   Patient Active Problem List   Diagnosis   • Dichorionic diamniotic twin pregnancy in second trimester   • Pregnancy resulting from assisted reproductive technology in second trimester   • Pregnancy   • Preeclampsia, third trimester       Postoperative Diagnosis: Same as above                                             .                                              Preeclampsia with severe manifestations ( B/P) AEDF twin B                                              Delivery  Viable twins  Procedure(s): primarylow transverse  delivery  ( one layer closure)    Surgeons: Surgeon(s) and Role:     * Bari Hughes DO - Primary    Anesthesia: Spinal    Estimated Blood Loss: 1200 mL mL    IV Fluids: [unfilled]    Preoperative antibiotic: Ancef (cefazolin) 2 grams    Blood products:   Blood Administration Record     None          Pathology:   Order Name Source Comment Collection Info Order Time   TISSUE PATHOLOGY EXAM Placenta   Placenta A Collected By: Bari Hughes DO 10/20/2018  7:15 PM       Drains: Olivares catheter to gravity    Complications: None    Condition: Stable to recovery room                                          Infant:              Carloz Alka MORTON [[id number]]         Tiffani Carty [[id number]]          Gender:    Carloz Alka MORTON [[id number]]   male     Tiffani Carty [[id number]]   female   infant    Weight:    CarlozAlka blackwell [[id number]]   1960 g (4 lb 5.1 oz)     Tiffani Carty [[id number]]   1770 g (3 lb 14.4 oz)      Apgars:    Alka Carty [[id number]]   7      Tiffani Carty [[id number]]       @ 1 minute /        Alka Carty [[id number]]   8      Tiffani Carty [[id number]]        @ 5 minutes    Cord gases: Venous:  @BABYNOHDR(BRIEFLAB, PHCVEN, BECVEN)@     Arterial:  @BABYNOHDR(BRIEFLAB, PHCART, BECART)@   Indications:     28-year-old white female  1 para 0 at 34 weeks 0 days gestation with prenatal care by Dr. Sonido Kaplan in [facility].  The patient has a twin pregnancy diamniotic dichorionic as a result of in vitro fertilization.  She presented to [facility] office for fetal growth.  She also noted to have a headache and decreased fetal movement the previous week.  Her B/P   on initial presentation was 148 / 100 with 2+ protein on dipstick.  Ultrasound notes that  twin A was appropriately  grown with fetal weight 2436 g.  Amniotic fluid , biophysical profile and Dopplers are normal.  Twin B reveals overall  two-week growth lag with a 4 week lag in fetal abdominal circumference.  Estimated fetal weight 1705 g.  Umbilical Doppler revealed absent end-diastolic flow.  Patient was admitted to [facility],  Steroids given for fetal lung maturity enhancement and placed on magnesium sulfate for seizure prophylaxis.  Patient had severe running about severe range blood pressures that responded to IV labetalol.  Once patient reached her steroid window we believed  delivery was indicated.  Methodology and the risk procedure was discussed with patient all questions were  answered informed consent obtained.  Patient also had a consultation with NICU.         Operative Summary:   After obtaining informed consent the patient was taken to the operating room where adequate anesthesia was obtained.  Olivares catheter was placed in the bladder preoperatively.  IV antibiotics were given preoperatively.       The abdomen was prepped and draped in the usual sterile fashion for  delivery.  After confirming adequate anesthesia a Pfannenstiel skin incision was made with the scalpel and carried through to the underlying layer of fascia.  The fascia was incised in the midline and  the incision extended laterally with the Littlejohn scissors and with blunt dissection.       The upper aspect of the fascia was grasped with 2 Kocher clamps, elevated, and dissected off the underlying rectus muscles bluntly and with the Littlejohn scissors.  The Kocher clamps were removed and applied to the inferior aspect of the fascia.  The fascia was dissected off of the rectus muscles in the same fashion.  The peritoneum was entered bluntly.  The incision was stretched and the bladder blade and Louise retractor inserted for visualization of the uterus.       The uterus was incised with the scalpel in a low transverse fashion.  The uterine incision was entered digitally and the incision extended bluntly in a cranial-caudal fashion.  Retractors were removed and membranes were ruptured.  Twin A was delivered atraumatically from breech presentation.  The umbilical cord was clamped and cut and the nose and mouth bulb suctioned.  The infant was handed off to waiting pediatric staff.  Twin B was delivered vertex presentation atraumatically.  Nuchal cord was clamped and cut and the nose and mouth bulb suctioned.  Twin B was handed off to waiting pediatric staff     Cord blood gases were collected from a clamped segment of umbilical cord.  Cord blood was collected.  The placenta was removed using cord traction and uterine massage.  The uterus was exteriorized and cleared of all clots and debris.  The uterine incision was repaired with #1 Chromic gut in a running locked fashion. A single-layer technique was used.  Additional hemostatic measures required: none.    The incision was inspected and excellent hemostasis was noted.  The tubes and ovaries were noted to be normal.  The appendix was vizualized, palpated, and noted to be normal..  The uterus was returned to the abdomen.  The gutters were cleared of all clots and debris.  Irrigation was used.  The uterine incision was again inspected and found to be hemostatic.       The  peritoneum was reapproximated with 2-0 Chromic gut.  The fascia was closed with 0 PDS in a running fashion.  The subcutaneous space was reapproximated using 0 Plain gut.      The skin was closed using 3-0 Prolene.  The patient was transferred to the recovery room in stable condition.

## 2018-10-21 LAB
ABO GROUP BLD: NORMAL
ALP SERPL-CCNC: 184 U/L (ref 25–100)
ALT SERPL W P-5'-P-CCNC: 16 U/L (ref 7–40)
AST SERPL-CCNC: 28 U/L (ref 0–33)
BASOPHILS # BLD AUTO: 0.02 10*3/MM3 (ref 0–0.2)
BASOPHILS NFR BLD AUTO: 0.1 % (ref 0–1)
BILIRUB SERPL-MCNC: 0.1 MG/DL (ref 0.3–1.2)
CREAT BLD-MCNC: 0.72 MG/DL (ref 0.6–1.3)
DEPRECATED RDW RBC AUTO: 47.4 FL (ref 37–54)
EOSINOPHIL # BLD AUTO: 0.01 10*3/MM3 (ref 0–0.3)
EOSINOPHIL NFR BLD AUTO: 0.1 % (ref 0–3)
ERYTHROCYTE [DISTWIDTH] IN BLOOD BY AUTOMATED COUNT: 14.5 % (ref 11.3–14.5)
FETAL BLEED: NEGATIVE
HCT VFR BLD AUTO: 28.1 % (ref 34.5–44)
HGB BLD-MCNC: 8.7 G/DL (ref 11.5–15.5)
IMM GRANULOCYTES # BLD: 0.09 10*3/MM3 (ref 0–0.03)
IMM GRANULOCYTES NFR BLD: 0.6 % (ref 0–0.6)
LDH SERPL-CCNC: 375 U/L (ref 120–246)
LYMPHOCYTES # BLD AUTO: 2.55 10*3/MM3 (ref 0.6–4.8)
LYMPHOCYTES NFR BLD AUTO: 18.2 % (ref 24–44)
MCH RBC QN AUTO: 27.7 PG (ref 27–31)
MCHC RBC AUTO-ENTMCNC: 31 G/DL (ref 32–36)
MCV RBC AUTO: 89.5 FL (ref 80–99)
MONOCYTES # BLD AUTO: 0.69 10*3/MM3 (ref 0–1)
MONOCYTES NFR BLD AUTO: 4.9 % (ref 0–12)
NEUTROPHILS # BLD AUTO: 10.68 10*3/MM3 (ref 1.5–8.3)
NEUTROPHILS NFR BLD AUTO: 76.1 % (ref 41–71)
NUMBER OF DOSES: NORMAL
PLATELET # BLD AUTO: 167 10*3/MM3 (ref 150–450)
PMV BLD AUTO: 11.8 FL (ref 6–12)
RBC # BLD AUTO: 3.14 10*6/MM3 (ref 3.89–5.14)
RH BLD: NEGATIVE
URATE SERPL-MCNC: 5.9 MG/DL (ref 3.1–7.8)
WBC NRBC COR # BLD: 14.04 10*3/MM3 (ref 3.5–10.8)

## 2018-10-21 PROCEDURE — 84550 ASSAY OF BLOOD/URIC ACID: CPT | Performed by: OBSTETRICS & GYNECOLOGY

## 2018-10-21 PROCEDURE — 25010000002 ONDANSETRON PER 1 MG: Performed by: OBSTETRICS & GYNECOLOGY

## 2018-10-21 PROCEDURE — 83615 LACTATE (LD) (LDH) ENZYME: CPT | Performed by: OBSTETRICS & GYNECOLOGY

## 2018-10-21 PROCEDURE — 85025 COMPLETE CBC W/AUTO DIFF WBC: CPT | Performed by: OBSTETRICS & GYNECOLOGY

## 2018-10-21 PROCEDURE — 84075 ASSAY ALKALINE PHOSPHATASE: CPT | Performed by: OBSTETRICS & GYNECOLOGY

## 2018-10-21 PROCEDURE — 82565 ASSAY OF CREATININE: CPT | Performed by: OBSTETRICS & GYNECOLOGY

## 2018-10-21 PROCEDURE — 84460 ALANINE AMINO (ALT) (SGPT): CPT | Performed by: OBSTETRICS & GYNECOLOGY

## 2018-10-21 PROCEDURE — 84450 TRANSFERASE (AST) (SGOT): CPT | Performed by: OBSTETRICS & GYNECOLOGY

## 2018-10-21 PROCEDURE — 85461 HEMOGLOBIN FETAL: CPT | Performed by: OBSTETRICS & GYNECOLOGY

## 2018-10-21 PROCEDURE — 86901 BLOOD TYPING SEROLOGIC RH(D): CPT | Performed by: OBSTETRICS & GYNECOLOGY

## 2018-10-21 PROCEDURE — 86900 BLOOD TYPING SEROLOGIC ABO: CPT | Performed by: OBSTETRICS & GYNECOLOGY

## 2018-10-21 PROCEDURE — 25010000002 NALBUPHINE PER 10 MG: Performed by: ANESTHESIOLOGY

## 2018-10-21 PROCEDURE — 82247 BILIRUBIN TOTAL: CPT | Performed by: OBSTETRICS & GYNECOLOGY

## 2018-10-21 RX ORDER — SODIUM CHLORIDE, SODIUM LACTATE, POTASSIUM CHLORIDE, CALCIUM CHLORIDE 600; 310; 30; 20 MG/100ML; MG/100ML; MG/100ML; MG/100ML
125 INJECTION, SOLUTION INTRAVENOUS CONTINUOUS
Status: DISCONTINUED | OUTPATIENT
Start: 2018-10-21 | End: 2018-10-23

## 2018-10-21 RX ORDER — LABETALOL 200 MG/1
200 TABLET, FILM COATED ORAL EVERY 8 HOURS SCHEDULED
Status: DISCONTINUED | OUTPATIENT
Start: 2018-10-21 | End: 2018-10-24 | Stop reason: HOSPADM

## 2018-10-21 RX ADMIN — SIMETHICONE CHEW TAB 80 MG 80 MG: 80 TABLET ORAL at 16:16

## 2018-10-21 RX ADMIN — LEVOTHYROXINE SODIUM 50 MCG: 0.05 TABLET ORAL at 06:51

## 2018-10-21 RX ADMIN — DEXTROSE AND SODIUM CHLORIDE 96 ML/HR: 5; 200 INJECTION, SOLUTION INTRAVENOUS at 08:16

## 2018-10-21 RX ADMIN — OXYCODONE HYDROCHLORIDE AND ACETAMINOPHEN 1 TABLET: 10; 325 TABLET ORAL at 22:24

## 2018-10-21 RX ADMIN — SIMETHICONE CHEW TAB 80 MG 80 MG: 80 TABLET ORAL at 08:05

## 2018-10-21 RX ADMIN — SODIUM CHLORIDE, POTASSIUM CHLORIDE, SODIUM LACTATE AND CALCIUM CHLORIDE 125 ML/HR: 600; 310; 30; 20 INJECTION, SOLUTION INTRAVENOUS at 20:13

## 2018-10-21 RX ADMIN — OXYCODONE HYDROCHLORIDE AND ACETAMINOPHEN 1 TABLET: 10; 325 TABLET ORAL at 13:05

## 2018-10-21 RX ADMIN — OXYCODONE HYDROCHLORIDE AND ACETAMINOPHEN 1 TABLET: 10; 325 TABLET ORAL at 16:15

## 2018-10-21 RX ADMIN — DOCUSATE SODIUM 100 MG: 100 CAPSULE, LIQUID FILLED ORAL at 08:05

## 2018-10-21 RX ADMIN — DOCUSATE SODIUM 100 MG: 100 CAPSULE, LIQUID FILLED ORAL at 16:16

## 2018-10-21 RX ADMIN — NALBUPHINE HYDROCHLORIDE 2.6 MG: 20 INJECTION, SOLUTION INTRAMUSCULAR; INTRAVENOUS; SUBCUTANEOUS at 08:17

## 2018-10-21 RX ADMIN — LABETALOL HYDROCHLORIDE 200 MG: 200 TABLET, FILM COATED ORAL at 08:05

## 2018-10-21 RX ADMIN — LABETALOL HYDROCHLORIDE 200 MG: 200 TABLET, FILM COATED ORAL at 16:15

## 2018-10-21 RX ADMIN — GUAIFENESIN AND DEXTROMETHORPHAN HYDROBROMIDE 1 TABLET: 600; 30 TABLET, EXTENDED RELEASE ORAL at 10:52

## 2018-10-21 RX ADMIN — FAMOTIDINE 20 MG: 10 INJECTION, SOLUTION INTRAVENOUS at 08:05

## 2018-10-21 RX ADMIN — ONDANSETRON 4 MG: 2 INJECTION, SOLUTION INTRAMUSCULAR; INTRAVENOUS at 13:08

## 2018-10-21 NOTE — PROGRESS NOTES
Laborist note: Postoperative day #1 status post  section    Referring provider: Dr. Jen Kaplan    Chief complaint: Severe preeclampsia    S.  Patient denies headache, scotomata or epigastric pain.    O.  T = 97.1, P = 78, R = 16, BP = 125/65.  Laboratories: LDH = 375, ALP and AST WNL, hematocrit = 28.1%, platelet = 167,000  Examination: Chest: Clear to auscultation, normal air movement    Heart: Regular rate and rhythm without murmurs, gallops or rubs.    Abdomen: Skin incision is clean, dry and intact with subcuticular suture in place.    Extremities: 1+ edema.  No calf tenderness.    A. Status post  section secondary to severe preeclampsia.  Normotensive at this time.    P. Discontinue magnesium sulfate.  We'll follow on antepartum tonight.  If BP stable, transferred to mother-baby tomorrow.

## 2018-10-22 PROCEDURE — 25010000002 RHO D IMMUNE GLOBULIN 1500 UNIT/2ML SOLUTION PREFILLED SYRINGE: Performed by: OBSTETRICS & GYNECOLOGY

## 2018-10-22 PROCEDURE — 94640 AIRWAY INHALATION TREATMENT: CPT

## 2018-10-22 RX ORDER — ALBUTEROL SULFATE 2.5 MG/3ML
2.5 SOLUTION RESPIRATORY (INHALATION) EVERY 6 HOURS PRN
Status: DISCONTINUED | OUTPATIENT
Start: 2018-10-22 | End: 2018-10-24 | Stop reason: HOSPADM

## 2018-10-22 RX ADMIN — OXYCODONE HYDROCHLORIDE AND ACETAMINOPHEN 1 TABLET: 5; 325 TABLET ORAL at 12:36

## 2018-10-22 RX ADMIN — SODIUM CHLORIDE, POTASSIUM CHLORIDE, SODIUM LACTATE AND CALCIUM CHLORIDE 125 ML/HR: 600; 310; 30; 20 INJECTION, SOLUTION INTRAVENOUS at 03:55

## 2018-10-22 RX ADMIN — LABETALOL HYDROCHLORIDE 200 MG: 200 TABLET, FILM COATED ORAL at 14:04

## 2018-10-22 RX ADMIN — LABETALOL HYDROCHLORIDE 200 MG: 200 TABLET, FILM COATED ORAL at 21:55

## 2018-10-22 RX ADMIN — OXYCODONE HYDROCHLORIDE AND ACETAMINOPHEN 1 TABLET: 5; 325 TABLET ORAL at 16:17

## 2018-10-22 RX ADMIN — ALBUTEROL SULFATE 2.5 MG: 2.5 SOLUTION RESPIRATORY (INHALATION) at 22:52

## 2018-10-22 RX ADMIN — SIMETHICONE CHEW TAB 80 MG 80 MG: 80 TABLET ORAL at 08:05

## 2018-10-22 RX ADMIN — OXYCODONE HYDROCHLORIDE AND ACETAMINOPHEN 1 TABLET: 5; 325 TABLET ORAL at 21:55

## 2018-10-22 RX ADMIN — SODIUM CHLORIDE, POTASSIUM CHLORIDE, SODIUM LACTATE AND CALCIUM CHLORIDE 125 ML/HR: 600; 310; 30; 20 INJECTION, SOLUTION INTRAVENOUS at 13:12

## 2018-10-22 RX ADMIN — DOCUSATE SODIUM 100 MG: 100 CAPSULE, LIQUID FILLED ORAL at 21:55

## 2018-10-22 RX ADMIN — SODIUM CHLORIDE, POTASSIUM CHLORIDE, SODIUM LACTATE AND CALCIUM CHLORIDE 125 ML/HR: 600; 310; 30; 20 INJECTION, SOLUTION INTRAVENOUS at 19:49

## 2018-10-22 RX ADMIN — SIMETHICONE CHEW TAB 80 MG 80 MG: 80 TABLET ORAL at 21:55

## 2018-10-22 RX ADMIN — DOCUSATE SODIUM 100 MG: 100 CAPSULE, LIQUID FILLED ORAL at 05:59

## 2018-10-22 RX ADMIN — PRENATAL VIT W/ FE FUMARATE-FA TAB 27-0.8 MG 1 TABLET: 27-0.8 TAB at 16:17

## 2018-10-22 RX ADMIN — ALBUTEROL SULFATE 2.5 MG: 2.5 SOLUTION RESPIRATORY (INHALATION) at 17:11

## 2018-10-22 RX ADMIN — LABETALOL HYDROCHLORIDE 200 MG: 200 TABLET, FILM COATED ORAL at 05:59

## 2018-10-22 RX ADMIN — OXYCODONE HYDROCHLORIDE AND ACETAMINOPHEN 1 TABLET: 10; 325 TABLET ORAL at 03:56

## 2018-10-22 RX ADMIN — SIMETHICONE CHEW TAB 80 MG 80 MG: 80 TABLET ORAL at 03:54

## 2018-10-22 RX ADMIN — OXYCODONE HYDROCHLORIDE AND ACETAMINOPHEN 1 TABLET: 5; 325 TABLET ORAL at 08:05

## 2018-10-22 RX ADMIN — LEVOTHYROXINE SODIUM 50 MCG: 0.05 TABLET ORAL at 05:59

## 2018-10-22 RX ADMIN — HUMAN RHO(D) IMMUNE GLOBULIN 1500 UNITS: 1500 SOLUTION INTRAMUSCULAR; INTRAVENOUS at 00:19

## 2018-10-22 NOTE — PROGRESS NOTES
Laborist note: Postoperative day #2 status post  section    Referring provider: Dr. Jen Kaplan    Chief complaint: Severe preeclampsia    S.  Denies headache, scotomata or epigastric pain.  Dizziness has improved since discontinuation of magnesium sulfate.    O.  T = 98.3, P = 78, R = 18, BP = 142/67  Laboratories: No new studies.  Examination:   Chest: Normal air movement.  Left upper lobe wheezing.   Heart: Regular rate and rhythm without murmurs, gallops or rubs.   Abdomen: Transverse skin incision is clean, dry and intact.  Subcuticular suture is in place.   Extremities: Calves nontender.    A.  Doing well.  Blood pressure stable on current antihypertensive regimen.    P.  Ready for transfer to mother-baby.

## 2018-10-22 NOTE — LACTATION NOTE
Mom states she has not pumped since last night.  Parents report that nurse told them to take the night off since the babies were not eating much yet.  Encouraged mom to not take night off tonight and start pumping q 3 hours ASAP.  Mom has a history of infertility and required IVF, this puts her at an increased risk for milk production and pumping often is extremely important for her.

## 2018-10-23 PROBLEM — E03.8 OTHER SPECIFIED HYPOTHYROIDISM: Status: ACTIVE | Noted: 2018-10-23

## 2018-10-23 LAB
CYTO UR: NORMAL
LAB AP CASE REPORT: NORMAL
LAB AP CLINICAL INFORMATION: NORMAL
PATH REPORT.FINAL DX SPEC: NORMAL
PATH REPORT.GROSS SPEC: NORMAL

## 2018-10-23 PROCEDURE — 94640 AIRWAY INHALATION TREATMENT: CPT

## 2018-10-23 RX ADMIN — DOCUSATE SODIUM 100 MG: 100 CAPSULE, LIQUID FILLED ORAL at 16:16

## 2018-10-23 RX ADMIN — ALBUTEROL SULFATE 2.5 MG: 2.5 SOLUTION RESPIRATORY (INHALATION) at 04:40

## 2018-10-23 RX ADMIN — OXYCODONE HYDROCHLORIDE AND ACETAMINOPHEN 1 TABLET: 5; 325 TABLET ORAL at 16:16

## 2018-10-23 RX ADMIN — OXYCODONE HYDROCHLORIDE AND ACETAMINOPHEN 1 TABLET: 5; 325 TABLET ORAL at 22:02

## 2018-10-23 RX ADMIN — OXYCODONE HYDROCHLORIDE AND ACETAMINOPHEN 1 TABLET: 5; 325 TABLET ORAL at 08:16

## 2018-10-23 RX ADMIN — LABETALOL HYDROCHLORIDE 200 MG: 200 TABLET, FILM COATED ORAL at 14:17

## 2018-10-23 RX ADMIN — SODIUM CHLORIDE, POTASSIUM CHLORIDE, SODIUM LACTATE AND CALCIUM CHLORIDE 125 ML/HR: 600; 310; 30; 20 INJECTION, SOLUTION INTRAVENOUS at 03:26

## 2018-10-23 RX ADMIN — OXYCODONE HYDROCHLORIDE AND ACETAMINOPHEN 1 TABLET: 5; 325 TABLET ORAL at 03:20

## 2018-10-23 RX ADMIN — SIMETHICONE CHEW TAB 80 MG 80 MG: 80 TABLET ORAL at 22:03

## 2018-10-23 RX ADMIN — LABETALOL HYDROCHLORIDE 200 MG: 200 TABLET, FILM COATED ORAL at 06:28

## 2018-10-23 RX ADMIN — OXYCODONE HYDROCHLORIDE AND ACETAMINOPHEN 1 TABLET: 5; 325 TABLET ORAL at 12:26

## 2018-10-23 RX ADMIN — LEVOTHYROXINE SODIUM 50 MCG: 0.05 TABLET ORAL at 06:28

## 2018-10-23 RX ADMIN — SIMETHICONE CHEW TAB 80 MG 80 MG: 80 TABLET ORAL at 16:16

## 2018-10-23 RX ADMIN — ALBUTEROL SULFATE 2.5 MG: 2.5 SOLUTION RESPIRATORY (INHALATION) at 09:06

## 2018-10-23 RX ADMIN — LABETALOL HYDROCHLORIDE 200 MG: 200 TABLET, FILM COATED ORAL at 22:02

## 2018-10-23 NOTE — PROGRESS NOTES
Postpartum Progress Note    Patient name: Yamilex Carty  YOB: 1990   MRN: [id number]  Referring Provider: Jen Kaplan DO  Admission Date: 10/18/2018  Date of Service: 10/23/2018    ID: 28 y.o.     Diagnosis:   S/p  delivery 3 Days Post-Op   Patient Active Problem List   Diagnosis   • Dichorionic diamniotic twin pregnancy in second trimester   • Pregnancy resulting from assisted reproductive technology in second trimester   • Pregnancy   • Preeclampsia, third trimester   • Other specified hypothyroidism   •  delivery due to maternal disorder, delivered, curr hospitaliz   CC: twins  preeclampsia    Subjective:      No complaints.  lite lochia. + flatus  Ambulating, voiding, tolerating diet.  Pain well controlled.  The patient is currently breastfeeding.   This babies in NICU.    Objective:      Vital signs:  Vital Signs Range for the last 24 hours  Temperature: Temp:  [98 °F (36.7 °C)-99.2 °F (37.3 °C)] 98.8 °F (37.1 °C)   Temp Source: Temp src: Oral   BP: BP: (133-160)/(72-91) 135/87   Pulse: Heart Rate:  [78-91] 81   Respirations: Resp:  [16-18] 16   Weight:       General: Alert & oriented x4, in no apparent distress  Abdomen: soft, nontender  Uterus: firm, nontender  Incision: clean, dry, intact, dressing clean, suture  Extremities: nontender; trace 1+ edema      Labs:  Lab Results   Component Value Date    WBC 14.04 (H) 10/21/2018    HGB 8.7 (L) 10/21/2018    HCT 28.1 (L) 10/21/2018    MCV 89.5 10/21/2018     10/21/2018       Results from last 7 days  Lab Units 10/21/18  0623   ABO TYPING  O   RH TYPING  Negative     External Prenatal Results     Pregnancy Outside Results - Transcribed From Office Records - See Scanned Records For Details     Test Value Date Time    Hgb 8.7 g/dL (L) 10/21/18 0623    Hct 28.1 % (L) 10/21/18 0623    ABO O  10/21/18 0623    Rh Negative  10/21/18 0623    Antibody Screen Positive  10/18/18 1547    Glucose Fasting GTT        Glucose Tolerance Test 1 hour       Glucose Tolerance Test 3 hour       Gonorrhea (discrete)       Chlamydia (discrete)       RPR       VDRL       Syphilis Antibody       Rubella       HBsAg       Herpes Simplex Virus PCR       Herpes Simplex VIrus Culture       HIV       Hep C RNA Quant PCR       Hep C Antibody       AFP       Group B Strep       GBS Susceptibility to Clindamycin       GBS Susceptibility to Erythromycin       Fetal Fibronectin       Genetic Testing, Maternal Blood             Drug Screening     Test Value Date Time    Urine Drug Screen       Amphetamine Screen       Barbiturate Screen       Benzodiazepine Screen       Methadone Screen       Phencyclidine Screen       Opiates Screen       THC Screen       Cocaine Screen       Propoxyphene Screen       Buprenorphine Screen       Methamphetamine Screen       Oxycodone Screen       Tricyclic Antidepressants Screen                   Assessment/Plan:      3 Days Post-Op s/p Procedure(s):   SECTION PRIMARY  1. POD # 3 after primary LTCS ( one layer closure) Twins 34w2/7 : stable .  2. Severe preeclampsia  B/P stable on labetalol 200 mg q 8 hr, labs stable  3. Patient unable to void markham was anchored   4. Anemia  Mild ( blood loss) 8.7/28  5. Newborns in NICU doing well    PLAN  1. D/C markham,  2. D/C IV  3. Increase activity  4.anticipate D/C tomorrow, F/U [facility] or [facility] one week for B/P assesment  5.D/C suture tomorrow.   Questions were answered.

## 2018-10-24 VITALS
BODY MASS INDEX: 35.46 KG/M2 | HEART RATE: 87 BPM | TEMPERATURE: 98.3 F | RESPIRATION RATE: 16 BRPM | SYSTOLIC BLOOD PRESSURE: 134 MMHG | DIASTOLIC BLOOD PRESSURE: 81 MMHG | OXYGEN SATURATION: 98 % | HEIGHT: 65 IN

## 2018-10-24 PROCEDURE — 99238 HOSP IP/OBS DSCHRG MGMT 30/<: CPT | Performed by: OBSTETRICS & GYNECOLOGY

## 2018-10-24 RX ORDER — OXYCODONE HYDROCHLORIDE AND ACETAMINOPHEN 5; 325 MG/1; MG/1
1 TABLET ORAL EVERY 4 HOURS PRN
Qty: 30 TABLET | Refills: 0 | Status: SHIPPED | OUTPATIENT
Start: 2018-10-24 | End: 2018-10-30

## 2018-10-24 RX ORDER — IBUPROFEN 400 MG/1
200 TABLET ORAL ONCE AS NEEDED
Status: COMPLETED | OUTPATIENT
Start: 2018-10-24 | End: 2018-10-24

## 2018-10-24 RX ORDER — LABETALOL 200 MG/1
200 TABLET, FILM COATED ORAL EVERY 8 HOURS SCHEDULED
Qty: 90 TABLET | Refills: 1 | Status: SHIPPED | OUTPATIENT
Start: 2018-10-24 | End: 2022-11-16

## 2018-10-24 RX ADMIN — LABETALOL HYDROCHLORIDE 200 MG: 200 TABLET, FILM COATED ORAL at 16:46

## 2018-10-24 RX ADMIN — LEVOTHYROXINE SODIUM 50 MCG: 0.05 TABLET ORAL at 06:06

## 2018-10-24 RX ADMIN — OXYCODONE HYDROCHLORIDE AND ACETAMINOPHEN 1 TABLET: 5; 325 TABLET ORAL at 02:58

## 2018-10-24 RX ADMIN — IBUPROFEN 200 MG: 400 TABLET ORAL at 16:42

## 2018-10-24 RX ADMIN — LABETALOL HYDROCHLORIDE 200 MG: 200 TABLET, FILM COATED ORAL at 06:06

## 2018-10-24 NOTE — DISCHARGE SUMMARY
Yamilex Carty  [id number]  1990     Referring physician: Jen Kaplan DO    Chief complaint:headache, fetal growth lag    Hospital procedures:US,  delivery    S/No complaints, ROS negative  O/98.3, 73, 16, 137/81   Lungs:CTA with occas insp wheeze   Heart:RRR, no m,g,r   Abd:+BS, soft, normal tend, inc D and I   Loch:R/S   Ext:no calf tend  A/1)POD #4 stable     2)severe preeclampsia- BP's stable on labetalol 200mg q8hrs     3)voiding dysfunction- resolved     4)hypothyroidism- stable    Hosp course:Pt was admitted through [facility] for HA, growth lag.  Pt was started on magnesium sulfate, labetalol for BP control, and  steroids.  After therapeutic window was reached, pt underwent  delivery (transverse/transverse) through a LTUI.  Post operatively, the pt was continued on magnesium for 24 hrs.  Pt was sent to mother-baby where she did well except for episode of urinary retention which resolved after catheter placed ~24 hrs.  Pt voiding well without problems.  Pt will have her suture removed today and sent home.  Pt will have f/u appt at [facility] next Monday for BP check.    P/1)remove suture     2)home     3)RTC [facility] this Monday for BP check     4)d/c meds: labetalol 200mg q8hrs    Percocet 5mg q6hr prn (#30)    Carlton Diaz MD  10/24/2018  7:08 AM

## 2018-10-24 NOTE — PLAN OF CARE
Problem: Patient Care Overview  Goal: Plan of Care Review  Outcome: Ongoing (interventions implemented as appropriate)   10/22/18 1950 10/24/18 0531   Plan of Care Review   Progress --  improving   OTHER   Outcome Summary --  VSS, pain controlled with percocet. pumping q3 hours. visits babies in NICU , anticipate DC today   Coping/Psychosocial   Plan of Care Reviewed With patient;spouse --      Goal: Individualization and Mutuality  Outcome: Ongoing (interventions implemented as appropriate)    Goal: Discharge Needs Assessment  Outcome: Ongoing (interventions implemented as appropriate)      Problem: Hypertensive Disorders in Pregnancy (Adult,Obstetrics,Pediatric)  Goal: Signs and Symptoms of Listed Potential Problems Will be Absent, Minimized or Managed (Hypertensive Disorders in Pregnancy)  Outcome: Ongoing (interventions implemented as appropriate)      Problem: Skin Injury Risk (Adult)  Goal: Identify Related Risk Factors and Signs and Symptoms  Outcome: Ongoing (interventions implemented as appropriate)    Goal: Skin Health and Integrity  Outcome: Ongoing (interventions implemented as appropriate)      Problem: Postpartum ( Delivery) (Adult,Obstetrics,Pediatric)  Goal: Signs and Symptoms of Listed Potential Problems Will be Absent, Minimized or Managed (Postpartum)  Outcome: Outcome(s) achieved Date Met: 10/24/18

## 2018-10-24 NOTE — PLAN OF CARE
Problem: Patient Care Overview  Goal: Plan of Care Review  Outcome: Outcome(s) achieved Date Met: 10/24/18   10/24/18 0859   Plan of Care Review   Progress improving   Coping/Psychosocial   Plan of Care Reviewed With patient     Goal: Individualization and Mutuality  Outcome: Outcome(s) achieved Date Met: 10/24/18    Goal: Discharge Needs Assessment  Outcome: Outcome(s) achieved Date Met: 10/24/18    Goal: Interprofessional Rounds/Family Conf  Outcome: Outcome(s) achieved Date Met: 10/24/18      Problem: Hypertensive Disorders in Pregnancy (Adult,Obstetrics,Pediatric)  Goal: Signs and Symptoms of Listed Potential Problems Will be Absent, Minimized or Managed (Hypertensive Disorders in Pregnancy)  Outcome: Outcome(s) achieved Date Met: 10/24/18      Problem: Fall Risk,  (Adult,Obstetrics,Pediatric)  Goal: Identify Related Risk Factors and Signs and Symptoms  Outcome: Outcome(s) achieved Date Met: 10/24/18    Goal: Absence of Maternal Fall  Outcome: Outcome(s) achieved Date Met: 10/24/18    Goal: Absence of [place] Fall/Drop  Outcome: Outcome(s) achieved Date Met: 10/24/18      Problem: Skin Injury Risk (Adult)  Goal: Identify Related Risk Factors and Signs and Symptoms  Outcome: Outcome(s) achieved Date Met: 10/24/18    Goal: Skin Health and Integrity  Outcome: Outcome(s) achieved Date Met: 10/24/18      Problem: Postpartum ( Delivery) (Adult,Obstetrics,Pediatric)  Goal: Anesthesia/Sedation Recovery  Outcome: Outcome(s) achieved Date Met: 10/24/18

## 2018-10-24 NOTE — PAYOR COMM NOTE
"Yamilex Spaulding (28 y.o. Female)   *Auth#[id number]  Delivery information      Date of Birth Social Security Number Address Home Phone MRN    1990  [address] [place] [address] [phone] [id number]    Christian Marital Status          Advent        Admission Date Admission Type Admitting Provider Attending Provider Department, Room/Bed    10/18/18 Elective Bari Hughes DO Lewellen, Thomas L, DO [facility] MOTHER BABY 4A, N417/1    Discharge Date Discharge Disposition Discharge Destination         Home or Self Care              Attending Provider:  Bari Hughes DO    Allergies:  Naproxen    Isolation:  None   Infection:  None   Code Status:  CPR    Ht:  163.8 cm (64.5\")   Wt:  95.2 kg (209 lb 12.8 oz)    Admission Cmt:  None   Principal Problem:  Preeclampsia, third trimester [O14.93]                 Active Insurance as of 10/18/2018     Primary Coverage     Payor Plan Insurance Group Employer/Plan Group    [employer] [facility] [place] [id number]     Payor Plan Address Payor Plan Phone Number Effective From Effective To    [address] [phone] 8/20/2017     [facility] [id number]       Subscriber Name Subscriber Birth Date Member ID       BUBBA SPAULDING 4/7/1987 [id number]                 Emergency Contacts      (Rel.) Home Phone Work Phone Mobile Phone    Bubba Spaulding (Spouse) [phone] -- --            Treatment Team     Provider Relationship Specialty Contact    Bari Hughes DO Attending, Surgeon, Physician of Record Obstetrics and Gynecology  [phone]    Janeth Vincent, RN Registered Nurse --      Margot Parmar Patient Care Technician --      Miesha Storey CNA OB Tech --      Malia Ovalle MSW  --  [phone]    Nissa Ramirez, RN Registered Nurse --            Problem List           Codes Noted - Resolved       Hospital    Other specified hypothyroidism ICD-10-CM: " E03.8  ICD-9-CM: 244.8 10/23/2018 - Present     delivery due to maternal disorder, delivered, curr hospitaliz ICD-10-CM: O82, O99.89  ICD-9-CM: 648.91 10/23/2018 - Present    Pregnancy ICD-10-CM: Z34.90  ICD-9-CM: V22.2 10/18/2018 - Present    * (Principal)Preeclampsia, third trimester ICD-10-CM: O14.93  ICD-9-CM: 642.43 10/18/2018 - Present    Dichorionic diamniotic twin pregnancy in second trimester ICD-10-CM: O30.042  ICD-9-CM: 651.03, V91.03 2018 - Present    Pregnancy resulting from assisted reproductive technology in second trimester ICD-10-CM: O09.812  ICD-9-CM: V23.85 2018 - Present                ICU Vital Signs     Row Name 10/24/18 0400 10/24/18 0000 10/23/18 2000 10/23/18 1845 10/23/18 1442       Vitals    Temp  -- 98.3 °F (36.8 °C)  -- --   pt in nicu 98.2 °F (36.8 °C)    Temp src  --  --  --  -- Oral    Pulse 73 85 88  -- 95    Heart Rate Source  --  --  --  -- Monitor    Resp  -- 16  --  -- 16    Resp Rate Source  --  --  --  -- Visual    /81 134/72 138/76  -- 136/86    Row Name 10/23/18 1228 10/23/18 1116 10/23/18 1015 10/23/18 0906 10/23/18 0857       Vitals    Temp 98 °F (36.7 °C)  --  --  --  --    Temp src Oral  --  --  --  --    Pulse 92  --  -- 81  --    Heart Rate Source Monitor  --  -- Monitor  --    Resp 18  --  -- 16  --    Resp Rate Source  --  --  -- Visual  --    /85  --  --  --  --    BP Location Left arm  --  --  --  --       Oxygen Therapy    Device (Oxygen Therapy)  --  --  -- room air  --       Patient Observation    Observations  -- PT SITTING ON SIDE OF THE BED, PUMPING PT UP TO SHOWER.  -- dr platt at bedside    Row Name 10/23/18 0804 10/23/18 0628 10/23/18 0440 10/23/18 0319 10/22/18 6535       Vitals    Temp 98.8 °F (37.1 °C)  --  -- 99.2 °F (37.3 °C) 98.8 °F (37.1 °C)    Temp src Oral  --  -- Oral Oral    Pulse 81 86  -- 83 86    Heart Rate Source Monitor  --  -- Monitor Monitor    Resp 16  --  -- 18 18    Resp Rate Source Visual  --  -- Visual  Visual    /87 154/78  -- 137/91 147/77    BP Location Right arm  --  -- Right arm Right arm    BP Method Automatic  --  -- Automatic Automatic    Patient Position Sitting  --  -- Lying Sitting       Oxygen Therapy    Device (Oxygen Therapy)  --  -- room air  --  --    Row Name 10/22/18 2252 10/22/18 2154 10/22/18 1950 10/22/18 1935 10/22/18 1800       Vitals    Temp  --  --  -- 98.8 °F (37.1 °C)  --    Temp src  --  --  -- Oral  --    Pulse  -- 82  -- 83  --    Heart Rate Source  --  --  -- Monitor  --    Resp  --  --  -- 18  --    Resp Rate Source  --  --  -- Visual  --    BP  -- 160/75  -- 133/81  --    BP Location  --  --  -- Right arm  --    BP Method  --  --  -- Automatic  --    Patient Position  --  --  -- Sitting  --       Oxygen Therapy    Device (Oxygen Therapy) room air  -- room air  -- room air    Row Name 10/22/18 1608 10/22/18 1404 10/22/18 1230 10/22/18 1130 10/22/18 0754       Vitals    Temp 98.7 °F (37.1 °C) 98.6 °F (37 °C) 98 °F (36.7 °C)  -- 98.3 °F (36.8 °C)    Temp src Oral  -- Oral  -- Oral    Pulse 91 83 78  -- 78    Heart Rate Source Monitor  -- Monitor  -- Monitor    Resp 18 18 18  -- 18    Resp Rate Source Visual  -- Visual  -- Visual    /82 139/74 141/72  -- 142/67    BP Location Right arm  -- Right arm  -- Right arm    BP Method Automatic  -- Automatic  -- Automatic    Patient Position Sitting  -- Sitting  -- Lying       Patient Observation    Observations  --  --  -- --   Patient assisted to  with RN and spouse.  Going to [facility]   --    Row Name 10/22/18 0559 10/22/18 0350 10/22/18 0349 10/22/18 0213 10/22/18 0011       Vitals    Temp  -- 99 °F (37.2 °C)  --  -- 98.3 °F (36.8 °C)    Temp src  -- Oral  --  -- Oral    Pulse 90 75  --  -- 77    Heart Rate Source  -- Monitor  --  -- Monitor    Resp  -- 18  --  -- 18    Resp Rate Source  -- Visual  --  -- Visual    /89 -- 144/73  -- 131/61    BP Location  -- Right arm  --  -- Right arm    BP Method  -- Automatic  --  --  Automatic    Patient Position  -- Lying  --  -- Lying       Patient Observation    Observations  -- Pt sleeping between care. Pt's needs addressed.  -- Pt appears to be sleeping at this time. Pt now reports relief from the bladder pain and spasming.    Row Name 10/21/18 2353 10/21/18 2349 10/21/18 2322 10/21/18 2231 10/21/18 2216       Patient Observation    Observations Bladder u/s shows 800+ urine in bladder Pt unable to void. Will bladder scan. Pt states it feels like she is having bladder spasms. Pt attempting to use bedside commode. Pt requests privacy. Water running in the sink at this time. Pt's call bell is within reach and pt will call out when she is finished. Pt refuses I&O cath for now. Pt desires to see if pain meds may help relax her and make her more comfortable to be able to void. Bladder scan attempted, but pt could not tolerate pressure on abdomen. Pt attempted to void in bedside commode, but unable to go. 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    Row Name 10/21/18 2102 10/21/18 2052 10/21/18 2042 10/21/18 2036 10/21/18 2032       Vitals    Pulse 102 67 87 86 75       Oxygen Therapy    SpO2 98 % 98 % 99 % 92 % 98 %    Row Name 10/21/18 2024 10/21/18 2022 10/21/18 1924 10/21/18 1852 10/21/18 1830       Vitals    Temp  --  -- 98.5 °F (36.9 °C)  --  --    Temp src  --  -- Oral  --  --    Pulse 68 79 67  --  --    Heart Rate Source  --  -- Monitor  --  --    Resp  --  -- 18  --  --    Resp Rate Source  --  -- Visual  --  --    /73  -- 109/60  --  --    BP Location  --  -- Right arm  --  --    BP Method  --  -- Automatic  --  --    Patient Position  --  -- Sitting  --  --       Oxygen Therapy    SpO2  -- 97 %  --  --  --    Pulse Oximetry Type  -- Continuous  --  --  --    Device (Oxygen Therapy)  -- room air  --  --  --       Patient Observation    Observations  --  --  -- PT pumping  Spoke with Dr. Wick about patients low blood pressure and symptoms of light headedness.  MD states to hold next labetalol dose.     Row Name 10/21/18 1826 10/21/18 1715 10/21/18 1624 10/21/18 1600 10/21/18 1549       Vitals    Pulse 67  -- 76  --  --    Resp  --  --  -- 18  --    BP 95/51  -- 124/67  --  --       Patient Observation    Observations  -- PT up to bathroom with assistance,  While on toilet patient began to have lightheadedness and was assisted back to bed.  [facility] ot bedside commode.   --  -- Dr. wick at bedside discussing POC.  Mag off    Row Name 10/21/18 1524 10/21/18 1500 10/21/18 1445 10/21/18 1435 10/21/18 1431       Vitals    Pulse 78  --  -- 72 56    Resp  -- 16  --  --  --    /65  --  -- 110/68 105/65       Patient Observation    Observations  --  -- PT pumping PT attempted to go to NICU.  With standing at bedside pt got light headed and had to sit down.  pt back to bed and dizziness and lightheadedness resolved.  PT unable to go to NICU at this time,.  Pads changed.   --    Row Name 10/21/18 1400 10/21/18 1324 10/21/18 1300 10/21/18 1224 10/21/18 1200       Vitals    Pulse  -- 75  -- 63  --    Resp 16  -- 18  -- 16    BP  -- 125/77  -- 113/69  --       Patient Observation    Observations  --  -- PT Pumping  --  --    Row Name 10/21/18 1124 10/21/18 1100 10/21/18 1024 10/21/18 1010 10/21/18 1000       Vitals    Pulse 75  -- 68  --  --    Resp  -- 18  --  -- 16    /86  -- 133/70  --  --       Patient Observation    Observations Lactation at bedside.   --  -- Lactation called and will be to bedside to assist with pumping and hand expression.   --    Row Name 10/21/18 0924 10/21/18 0824 10/21/18 0805 10/21/18 0800 10/21/18 0732       Vitals    Temp  --  -- 97.1 °F (36.2 °C)  --  --    Temp src  --  -- Axillary  --  --    Pulse 69 74  --  -- 75    Heart Rate Source  --  -- Monitor  --  --    Resp  --  -- 16 16  --    Resp Rate Source  --  --  Visual  --  --    /72 129/70  --  -- 160/93    BP Location  --  -- Right arm  --  --    BP Method  --  -- Automatic  --  --    Patient Position  --  -- Sitting  --  --    Row Name 10/21/18 0624 10/21/18 0601 10/21/18 0524 10/21/18 0424 10/21/18 0328       Vitals    Temp  --  --  --  -- 98 °F (36.7 °C)    Temp src  --  --  --  -- Oral    Pulse 66 72 70 68 67    Heart Rate Source  -- Monitor Monitor  -- Monitor    Resp  -- 16 16 16 16    Resp Rate Source  -- Stethoscope Stethoscope  -- Visual    /94 167/87 165/90 141/84 142/95    BP Location  -- Right arm Right arm  -- Right arm    BP Method  -- Automatic Automatic  -- Automatic    Patient Position  -- Lying Lying  -- Lying    Row Name 10/21/18 0224 10/21/18 0124 10/21/18 0027 10/21/18 0010 10/21/18 0004       Vitals    Temp  --  --  -- 98 °F (36.7 °C)  --    Temp src  --  --  -- Oral  --    Pulse 68 71 67  -- 67    Heart Rate Source  --  -- Monitor  -- Monitor    Resp 16 16 16 16 16    Resp Rate Source  --  -- Stethoscope Visual Stethoscope    /88 150/88 157/86  -- 141/82    Row Name 10/20/18 2334 10/20/18 2304 10/20/18 2234 10/20/18 2216 10/20/18 2204       Vitals    Pulse 74 74 75 80 72    Heart Rate Source Monitor Monitor Monitor Monitor Monitor    Resp 16 16 16 16 16    Resp Rate Source Stethoscope Stethoscope Stethoscope Stethoscope Stethoscope    /83 166/86 146/82 164/85 179/94    BP Location  -- Right arm Right arm  --  --    BP Method  -- Automatic Automatic  --  --    Patient Position  -- Lying Lying  --  --    Row Name 10/20/18 2145 10/20/18 2125 10/20/18 2045 10/20/18 2030 10/20/18 2015       Vitals    Temp 97.3 °F (36.3 °C)  --  --  --  --    Temp src Axillary  --  --  --  --    Pulse 77  -- 99 63 93    Heart Rate Source Monitor  --  --  --  --    Resp 16  --  --  --  --    Resp Rate Source Stethoscope  --  --  --  --    /92  -- 118/64 98/77 126/86    Noninvasive MAP (mmHg) 121  -- 84 90 98    BP Location Right arm  --  --   --  --    BP Method Automatic  --  --  --  --    Patient Position Sitting  --  --  --  --       Oxygen Therapy    SpO2  --  -- 100 % 99 % 93 %       Patient Observation    Observations  -- back to APU from NICU  --  --  --    Row Name 10/20/18 2011 10/20/18 2010 10/20/18 2005 10/20/18 2000 10/20/18 1955       Vitals    Pulse 78 97  -- 86 80    BP  -- 113/92 145/76 123/78 131/91    Noninvasive MAP (mmHg)  -- 99 102 98 103       Oxygen Therapy    SpO2 100 % 99 % 98 % 100 % 100 %    Row Name 10/20/18 1950 10/20/18 1945 10/20/18 1944 10/20/18 1943 10/20/18 1819       Vitals    Temp  --  -- 97.6 °F (36.4 °C)  --  --    Temp src  --  -- Oral  --  --    Pulse 91 73 79  -- 87    Resp  --  -- 16  --  --    /93 122/79 127/79 127/79 146/81    Noninvasive MAP (mmHg) 102 96 96 96  --       Oxygen Therapy    SpO2 98 % 95 % 98 %  --  --    Row Name 10/20/18 1716 10/20/18 1616 10/20/18 1506 10/20/18 1456 10/20/18 1446       Vitals    Temp  -- 98.2 °F (36.8 °C)  --  --  --    Pulse 79 81 82 81 78    Resp 16 16 16 16 16    /87 143/89 125/81 156/90 149/84    Row Name 10/20/18 1433 10/20/18 1430 10/20/18 1330 10/20/18 1230 10/20/18 1130       Vitals    Temp  --  --  -- 98.4 °F (36.9 °C)  --    Pulse 88 90 80 83 96    Resp 16 16 16 16 16    BP --   labetalol given (!)  164/101 140/82 148/81 138/82    Row Name 10/20/18 1030 10/20/18 1023 10/20/18 0930 10/20/18 0830 10/20/18 0755       Vitals    Temp  --  --  --  -- 98.5 °F (36.9 °C)    Temp src  --  --  --  -- Oral    Pulse 93 82 93 83  --    Heart Rate Source  --  --  --  -- Monitor    Resp 16 18 16 16 16    Resp Rate Source  --  --  --  -- Visual    /95 -- 151/93 127/65  --    Row Name 10/20/18 0730 10/20/18 0630 10/20/18 0530 10/20/18 0512 10/20/18 0430       Vitals    Pulse 88 81 90 78 86    Heart Rate Source  -- Monitor Monitor Monitor Monitor    Resp 16 16 16 16 16    Resp Rate Source Visual Stethoscope Stethoscope Stethoscope Stethoscope    /64 123/65  138/85 127/75 174/91    Row Name 10/20/18 0418 10/20/18 0330 10/20/18 0230 10/20/18 0130 10/20/18 0030       Vitals    Temp 98 °F (36.7 °C)  --  --  --  --    Temp src Oral  --  --  --  --    Pulse  -- 82 76 84 86    Resp  -- 16 16 16 16    BP  -- 142/90 115/70 116/73 105/59    Row Name 10/19/18 2330 10/19/18 2230 10/19/18 2132 10/19/18 2130 10/19/18 2030       Vitals    Temp 98.6 °F (37 °C)  --  --  --  --    Temp src Oral  --  --  --  --    Pulse 85 93 86 88 95    Heart Rate Source Monitor  --  --  --  --    Resp 16 16 18  -- 18    Resp Rate Source Visual  --  --  --  --    /73 150/80 138/75 161/99 157/91    BP Location Right arm  --  --  --  --    BP Method Automatic  --  --  --  --    Patient Position Lying  --  --  --  --       Patient Observation    Observations  --  --  -- BP cuff slid down arm, repostioned  --    Row Name 10/19/18 1930 10/19/18 1832 10/19/18 1630 10/19/18 1537 10/19/18 1530       Vitals    Temp 97.9 °F (36.6 °C)  --  -- 98 °F (36.7 °C)  --    Temp src Oral  --  -- Oral  --    Pulse 78 86 92  -- 83    Heart Rate Source Monitor  --  --  --  --    Resp 16 16 18 16 16    Resp Rate Source Visual Visual Visual Visual Visual    /65 125/76 145/87  -- 125/74    BP Location Right arm  --  --  --  --    BP Method Automatic  --  --  --  --    Patient Position Lying  --  --  --  --    Row Name 10/19/18 1430 10/19/18 1330 10/19/18 1230 10/19/18 1144 10/19/18 1130       Vitals    Temp  --  --  -- 97.7 °F (36.5 °C)  --    Temp src  --  --  -- Oral  --    Pulse 93 96 101  -- 94    Resp 16 16 16 16 16    Resp Rate Source Visual Visual Visual Visual Visual    /81 135/79 155/99  -- 134/91    Row Name 10/19/18 1030 10/19/18 0930 10/19/18 0900 10/19/18 0830 10/19/18 0738       Vitals    Temp  --  --  --  -- 96.1 °F (35.6 °C)    [facility]  --  --  --  -- Axillary    Pulse 102 100 82 97  --    Resp 16  -- 16  -- 16    Resp Rate Source Visual  -- Visual  -- Visual    /91 152/91 126/68 161/96   --    Row Name 10/19/18 0730 10/19/18 0630 10/19/18 0530 10/19/18 0430 10/19/18 0330       Vitals    Temp  --  --  --  -- 97.6 °F (36.4 °C)    Temp src  --  --  --  -- Oral    Pulse 95 91 88 88 100    Heart Rate Source  --  --  --  -- Monitor    Resp  -- 16 14 18 18    Resp Rate Source  --  --  --  -- Visual    /95 149/91 132/77 134/83 103/65    BP Location  --  --  --  -- Right arm    BP Method  --  --  --  -- Automatic    Patient Position  --  --  --  -- Lying    Row Name 10/19/18 0230 10/19/18 0153 10/19/18 0151 10/19/18 0027 10/19/18 0023       Vitals    Pulse 89 90 90 86 85    Resp 16  -- 16  --  --    /66 134/85 134/85 121/66 117/65    Row Name 10/19/18 0019 10/19/18 0016 10/19/18 0005 10/18/18 2345 10/18/18 2313       Vitals    Temp  --  --  --  -- 97.6 °F (36.4 °C)    Temp src  --  --  --  -- Oral    Pulse 88 93 95 96 91    Resp  --  -- 16  -- 16    /70 144/80 141/95 178/97 152/95    Row Name 10/18/18 2303 10/18/18 2248 10/18/18 2110 10/18/18 2004 10/18/18 1954       Vitals    Pulse 93 100 98 100 101    Resp  -- 16 16 16  --    /100 169/98 149/92 140/93 142/91    Row Name 10/18/18 1945 10/18/18 1921 10/18/18 1859 10/18/18 1757 10/18/18 1753       Vitals    Temp  -- 98 °F (36.7 °C)  --  --  --    Temp src  -- Oral  --  --  --    Pulse 97 100 108 120 102    Heart Rate Source  -- Monitor  --  --  --    Resp  -- 18 18  --  --    Resp Rate Source  -- Visual  --  --  --    /97 (!)  166/101 138/88 150/88 151/94    BP Location  -- Right arm   nurse notified of blood pressure  --  --  --    BP Method  -- Automatic  --  --  --    Patient Position  -- Lying  --  --  --    Row Name 10/18/18 1747 10/18/18 1742 10/18/18 1741 10/18/18 1739 10/18/18 1737       Vitals    Temp  -- --  -- 99.3 °F (37.4 °C)  --    Temp src  --  --  -- Oral  --    Pulse 102 111 (!)  122  --  --    Heart Rate Source  --  --  -- Monitor  --    Resp 18 --  -- 18  --    Resp Rate Source Visual --  -- Visual  --    BP  "143/93 139/86 (!)  148/101  --  --    BP Location  --  --  -- Right arm  --    BP Method  --  --  -- Automatic  --    Patient Position  --  --  -- Sitting  --       Patient Observation    Observations  -- new bp cuff cord changed out  -- new red bp cuff applied new bp cuff applied    Row Name 10/18/18 1729 10/18/18 1728 10/18/18 1725 10/18/18 1712 10/18/18 1700       Vitals    Temp  --  -- 98.7 °F (37.1 °C)  --  --    Temp src  --  -- Oral  --  --    Pulse 111  --  -- 105 100    Heart Rate Source  --  -- Monitor  --  --    Resp  --  -- 18  --  --    Resp Rate Source  --  -- Visual  --  --    /94  --  -- (!)  145/103 (!)  158/105    BP Location  --  -- Right arm  --  --    BP Method  --  -- Automatic  --  --    Patient Position  --  -- Sitting  --  --       Patient Observation    Observations readjusted bp cuff bp cuff not working right  --  --  --    Row Name 10/18/18 1556 10/18/18 1541 10/18/18 1526 10/18/18 1512 10/18/18 1327       Height and Weight    Height  --  --  -- 163.8 cm (64.5\")  --    Ideal Body Weight (IBW) (kg)  --  --  -- 56.15  --    Weight in (lb) to have BMI = 25  --  --  -- 147.6  --       Vitals    Temp  --  -- 98.1 °F (36.7 °C)  --  --    Temp src  --  -- Oral  --  --    Pulse 106 116 114  --  --    BP (!)  161/107 (!)  149/105 (!)  163/105  -- 146/98    Row Name 10/18/18 1317                   Height and Weight    Weight 95.2 kg (209 lb 12.8 oz)           Vitals    /100            Hospital Medications (all)       Dose Frequency Start End    acetaminophen (TYLENOL) tablet 650 mg 650 mg Once As Needed 10/20/2018 10/20/2018    Sig - Route: Take 2 tablets by mouth 1 (One) Time As Needed for Mild Pain . - Oral    albuterol (PROVENTIL) nebulizer solution 0.083% 2.5 mg/3mL 2.5 mg Every 6 Hours PRN 10/22/2018     Sig - Route: Take 2.5 mg by nebulization Every 6 (Six) Hours As Needed for Shortness of Air. - Nebulization    Cosign for Ordering: Accepted by Bari Hughes DO on 10/23/2018 "  8:48 AM    betamethasone acetate-betamethasone sodium phosphate (CELESTONE SOLUSPAN) injection 12 mg 12 mg Every 24 Hours 10/18/2018 10/19/2018    Sig - Route: Inject 2 mL into the appropriate muscle as directed by prescriber Daily. - Intramuscular    ceFAZolin in dextrose (ANCEF) IVPB solution 2 g 2 g Once 10/20/2018 10/20/2018    Sig - Route: Infuse 100 mL into a venous catheter 1 (One) Time. - Intravenous    docusate sodium (COLACE) capsule 100 mg 100 mg 2 Times Daily PRN 10/20/2018     Sig - Route: Take 1 capsule by mouth 2 (Two) Times a Day As Needed for Constipation. - Oral    guaifenesin-dextromethorphan (MUCINEX DM) tablet 1 tablet 1 tablet 2 Times Daily PRN 10/18/2018     Sig - Route: Take 1 tablet by mouth 2 (Two) Times a Day As Needed for Cough or Congestion. - Oral    HYDROmorphone (DILAUDID) injection 0.5 mg 0.5 mg Every 30 Minutes PRN 10/20/2018 10/21/2018    Sig - Route: Infuse 0.5 mL into a venous catheter Every 30 (Thirty) Minutes As Needed for Moderate Pain  or Severe Pain . - Intravenous    ketorolac (TORADOL) injection 30 mg 30 mg Once 10/20/2018 10/20/2018    Sig - Route: Infuse 1 mL into a venous catheter 1 (One) Time. - Intravenous    labetalol (NORMODYNE) tablet 200 mg 200 mg Every 8 Hours Scheduled 10/21/2018     Sig - Route: Take 1 tablet by mouth Every 8 (Eight) Hours. - Oral    labetalol (NORMODYNE,TRANDATE) 5 MG/ML injection  - ADS Override Pull   10/20/2018 10/20/2018    Notes to Pharmacy: Created by cabinet override    labetalol (NORMODYNE,TRANDATE) injection 20-80 mg 20-80 mg Every 10 Minutes PRN 10/18/2018 10/19/2018    Sig - Route: Infuse 4-16 mL into a venous catheter Every 10 (Ten) Minutes As Needed for High Blood Pressure (See Admin Instructions). - Intravenous    levothyroxine (SYNTHROID, LEVOTHROID) tablet 50 mcg 50 mcg Every Early Morning 10/19/2018     Sig - Route: Take 2 tablets by mouth Every Morning. - Oral    magnesium sulfate bolus from bag 0.07 g/mL solution 4 g 4 g  "Once 10/18/2018 10/18/2018    Sig - Route: Infuse 57.14 mL into a venous catheter 1 (One) Time. - Intravenous    methylergonovine (METHERGINE) injection 200 mcg 200 mcg Once As Needed 10/20/2018     Sig - Route: Inject 1 mL into the appropriate muscle as directed by prescriber 1 (One) Time As Needed (hemorrhage). - Intramuscular    metoclopramide (REGLAN) injection 10 mg 10 mg Once As Needed 10/20/2018     Sig - Route: Infuse 2 mL into a venous catheter 1 (One) Time As Needed for Heartburn. - Intravenous    misoprostol (CYTOTEC) tablet 800 mcg 800 mcg As Needed 10/20/2018     Sig - Route: Insert 4 tablets into the rectum As Needed (Postpartum Hemorrhage). - Rectal    nalbuphine (NUBAIN) injection 2.6 mg 2.6 mg Every 4 Hours PRN 10/20/2018 10/21/2018    Sig - Route: Infuse 0.13 mL into a venous catheter Every 4 (Four) Hours As Needed for Moderate Pain  (itching). - Intravenous    ondansetron (ZOFRAN) injection 4 mg 4 mg Once 10/19/2018 10/19/2018    Sig - Route: Infuse 2 mL into a venous catheter 1 (One) Time. - Intravenous    ondansetron (ZOFRAN) injection 4 mg 4 mg Once 10/20/2018     Sig - Route: Infuse 2 mL into a venous catheter 1 (One) Time. - Intravenous    oxyCODONE-acetaminophen (PERCOCET)  MG per tablet 1 tablet 1 tablet Every 4 Hours PRN 10/20/2018 10/30/2018    Sig - Route: Take 1 tablet by mouth Every 4 (Four) Hours As Needed for Severe Pain . - Oral    oxyCODONE-acetaminophen (PERCOCET) 5-325 MG per tablet 1 tablet 1 tablet Every 4 Hours PRN 10/20/2018 10/30/2018    Sig - Route: Take 1 tablet by mouth Every 4 (Four) Hours As Needed for Moderate Pain . - Oral    oxytocin in sodium chloride (PITOCIN) 30 UNIT/500ML infusion solution 650 mL/hr Once 10/20/2018     Sig - Route: Infuse 39 Units/hr into a venous catheter 1 (One) Time. - Intravenous    Linked Group 1:  \"Followed by\" Linked Group Details        oxytocin in sodium chloride (PITOCIN) 30 UNIT/500ML infusion solution 85 mL/hr Once 10/20/2018  " "   Sig - Route: Infuse 5.1 Units/hr into a venous catheter 1 (One) Time. - Intravenous    Linked Group 1:  \"Followed by\" Linked Group Details        prenatal vitamin 27-0.8 tablet 1 tablet 1 tablet Daily Before Supper 10/19/2018     Sig - Route: Take 1 tablet by mouth Daily Before Supper. - Oral    Rho D Immune Globulin (RHOPHYLAC) injection 1,500 Units 1,500 Units Once 10/21/2018 10/22/2018    Sig - Route: Infuse 2 mL into a venous catheter 1 (One) Time. - Intravenous    simethicone (MYLICON) chewable tablet 80 mg 80 mg 4 Times Daily PRN 10/20/2018     Sig - Route: Chew 1 tablet 4 (Four) Times a Day As Needed for Flatulence. - Oral    Sod Citrate-Citric Acid (BICITRA) solution 30 mL 30 mL Once 10/20/2018 10/20/2018    Sig - Route: Take 30 mL by mouth 1 (One) Time. - Oral    acetaminophen (TYLENOL) tablet 650 mg (Discontinued) 650 mg Every 6 Hours PRN 10/20/2018 10/23/2018    Sig - Route: Take 2 tablets by mouth Every 6 (Six) Hours As Needed for Mild Pain . - Oral    carboprost (HEMABATE) injection 250 mcg (Discontinued) 250 mcg As Needed 10/20/2018 10/23/2018    Sig - Route: Inject 1 mL into the appropriate muscle as directed by prescriber As Needed (hemorrhage). - Intramuscular    dextrose 5 % and sodium chloride 0.2 % infusion (Discontinued) 96 mL/hr Continuous 10/18/2018 10/23/2018    Sig - Route: Infuse 96 mL/hr into a venous catheter Continuous. - Intravenous    famotidine (PEPCID) injection 20 mg (Discontinued) 20 mg 2 Times Daily PRN 10/18/2018 10/23/2018    Sig - Route: Infuse 2 mL into a venous catheter 2 (Two) Times a Day As Needed (heartburn). - Intravenous    ibuprofen (ADVIL,MOTRIN) tablet 600 mg (Discontinued) 600 mg Once As Needed 10/20/2018 10/23/2018    Sig - Route: Take 1 tablet by mouth 1 (One) Time As Needed for Mild Pain . - Oral    labetalol (NORMODYNE,TRANDATE) injection 20 mg (Discontinued) 20 mg Once 10/20/2018 10/20/2018    Sig - Route: Infuse 4 mL into a venous catheter 1 (One) Time. - " Intravenous    labetalol (NORMODYNE,TRANDATE) injection 20-80 mg (Discontinued) 20-80 mg Every 10 Minutes PRN 10/20/2018 10/20/2018    Sig - Route: Infuse 4-16 mL into a venous catheter Every 10 (Ten) Minutes As Needed for High Blood Pressure (See Admin Instructions). - Intravenous    lactated ringers bolus 1,000 mL (Discontinued) 1,000 mL Once 10/20/2018 10/20/2018    Sig - Route: Infuse 1,000 mL into a venous catheter 1 (One) Time. - Intravenous    lactated ringers infusion (Discontinued) 125 mL/hr Continuous 10/20/2018 10/20/2018    Sig - Route: Infuse 125 mL/hr into a venous catheter Continuous. - Intravenous    lactated ringers infusion (Discontinued) 125 mL/hr Continuous 10/21/2018 10/23/2018    Sig - Route: Infuse 125 mL/hr into a venous catheter Continuous. - Intravenous    Magnesium Sulfate-Lact Ringers 40 GM/580ML (Discontinued) 2 g/hr Continuous 10/18/2018 10/21/2018    Sig - Route: Infuse 2 g/hr into a venous catheter Continuous. - Intravenous    ondansetron (ZOFRAN) injection 4 mg (Discontinued) 4 mg Every 6 Hours PRN 10/20/2018 10/23/2018    Sig - Route: Infuse 2 mL into a venous catheter Every 6 (Six) Hours As Needed for Nausea or Vomiting. - Intravenous    prenatal vitamin 27-0.8 tablet 1 tablet (Discontinued) 1 tablet Daily 10/18/2018 10/19/2018    Sig - Route: Take 1 tablet by mouth Daily. - Oral    Rho D Immune Globulin (RHOPHYLAC) injection 1,500 Units (Discontinued) 1,500 Units Once 10/21/2018 10/21/2018    Sig - Route: Inject 2 mL into the appropriate muscle as directed by prescriber 1 (One) Time. - Intramuscular    Cosign for Ordering: Accepted by Anthony Wick III, MD on 10/22/2018  8:22 AM    sodium chloride 0.9 % flush 3 mL (Discontinued) 3 mL Every 12 Hours Scheduled 10/18/2018 10/23/2018    Sig - Route: Infuse 3 mL into a venous catheter Every 12 (Twelve) Hours. - Intravenous    sodium chloride 0.9 % flush 5 mL (Discontinued) 5 mL As Needed 10/18/2018 10/20/2018    Sig - Route: Infuse  5 mL into a venous catheter As Needed for Line Care (After Medication Administration or Blood Draw). - Intravenous          Lab Results (last 7 days)     Procedure Component Value Units Date/Time    Tissue Pathology Exam [[id number]] Collected:  10/20/18 1914    Specimen:  Tissue from Placenta Updated:  10/23/18 1605     Case Report --     Surgical Pathology Report                         Case: [id number]                                  Authorizing Provider:  Bari Hughes DO     Collected:           10/20/2018 07:14 PM          Ordering Location:     [facility]   Received:            10/22/2018 06:50 AM                                 LABOR DELIVERY                                                               Pathologist:           Noah Wheeler MD                                                        Specimen:    Placenta                                                                                    Clinical Information --     The working history is 34.2 week twin gestation with preeclampsia.       Final Diagnosis --      PLACENTA:  Diamniotic, dichorionic twin placenta.  Fetal membranes with no meconium staining or acute inflammation.  Three vessel umbilical cords with no significant histopathologic change x2.  Third trimester villous maturation with mild villous dysmaturity (increased variability in villous size and shape), placenta A.  Third trimester villous maturation with focal villous infarction, largest infarct 3.0 cm, placenta B.  Succenturiate lobe present, placenta B.  No histologic features of an infection identified.    PCC/dlb         Gross Description --     Received in formalin labeled as placentas is a fused twin placenta designated by the obstetrician as follows:  One clamp, placenta A, two clamps, placenta B.  There is a thick glistening membrane dividing the disc into approximate equal proportions.  The fetal membranes are tan, thin, and semitranslucent and show a  marginal insertion. Site of rupture cannot be determined. Placenta A has an attached three vessel umbilical cord measures 14.0 cm in length by 1.2 cm in diameter and is tan/white to blue/gray with appropriate spiraling. No areas of torsion, hematomas, or true knots are appreciated. The cord inserts centrally, 6.0 cm from the placental disc margin. Placenta B has an attached three vessel umbilical cord measuring 18.0 cm in length by 1.1 cm in diameter.  The cord is tan/white to blue/gray with appropriate spiraling.  No areas of torsion, hematomas or true knots are appreciated.  The cord inserts centrally, 6.0 cm from the placental disc.  The placental disc has overall dimensions of 20.0 x 17.0 x 3.5 cm and there is a succenturiate lobe present on placenta B measuring 9.0 x 11.0 x 3.5 cm.  The entire placental disc weighs 731 grams trimmed of membranes and cord. The vessels between the primary and succenturiate lobe are intact with no signs of trauma or rupture.  The fetal surface is blue/gray glistening and well vascularized. The maternal surface is complete with well formed cotyledons and fibrotic parenchyma along the periphery.  No masses are appreciated.  There are a few large infarcts identified ranging from 1.5 to 3.0 cm in greatest dimension.  Representative sections are submitted. Summary of section: A - membrane roll and proximal cord from placenta A; B - mid cord and peripheral disc from placenta A; C - distal cord and central disc placenta A; D membrane roll and proximal cord from placenta B; -E - mid cord and peripheral disc to include the infarct from placenta B; F - distal cord and central disc from placenta B to include the infarct and G - dividing membrane.  HBM/dlb       Microscopic Description --     The slides are reviewed and demonstrate histopathologic features supporting the above rendered diagnosis.          Preeclampsia Panel [[id number]]  (Abnormal) Collected:  10/21/18 0728    Specimen:  Blood  Updated:  10/21/18 0844     Alkaline Phosphatase 184 (H) U/L      ALT (SGPT) 16 U/L      AST (SGOT) 28 U/L      Creatinine 0.72 mg/dL      Total Bilirubin 0.1 (L) mg/dL       (H) U/L      Uric Acid 5.9 mg/dL     CBC & Differential [[id number]] Collected:  10/21/18 0623    Specimen:  Blood Updated:  10/21/18 0653    Narrative:       The following orders were created for panel order CBC & Differential.  Procedure                               Abnormality         Status                     ---------                               -----------         ------                     CBC Auto Differential[[id number]]        Abnormal            Final result                 Please view results for these tests on the individual orders.    CBC Auto Differential [[id number]]  (Abnormal) Collected:  10/21/18 0623    Specimen:  Blood Updated:  10/21/18 0653     WBC 14.04 (H) 10*3/mm3      RBC 3.14 (L) 10*6/mm3      Hemoglobin 8.7 (L) g/dL      Hematocrit 28.1 (L) %      MCV 89.5 fL      MCH 27.7 pg      MCHC 31.0 (L) g/dL      RDW 14.5 %      RDW-SD 47.4 fl      MPV 11.8 fL      Platelets 167 10*3/mm3      Neutrophil % 76.1 (H) %      Lymphocyte % 18.2 (L) %      Monocyte % 4.9 %      Eosinophil % 0.1 %      Basophil % 0.1 %      Immature Grans % 0.6 %      Neutrophils, Absolute 10.68 (H) 10*3/mm3      Lymphocytes, Absolute 2.55 10*3/mm3      Monocytes, Absolute 0.69 10*3/mm3      Eosinophils, Absolute 0.01 10*3/mm3      Basophils, Absolute 0.02 10*3/mm3      Immature Grans, Absolute 0.09 (H) 10*3/mm3     Blood Gas, Arterial, Cord [[id number]]  (Abnormal) Collected:  10/20/18 2009    Specimen:  Cord Blood Arterial from Umbilical Cord Updated:  10/20/18 2126     pH, Cord Arterial 7.11 (C) pH Units      pCO2, Cord Arterial 60.2 (H) mmHg      pO2, Cord Arterial 11.3 (L) mmHg      HCO3, Cord Arterial 19.2 mmol/L      Base Exc, Cord Arterial -11.0 (L) mmol/L      Hemoglobin, Blood Gas 14.7 g/dL      Temperature 37.0 C      Barometric  Pressure for Blood Gas -- mmHg      Comment: PATM not performed at this facility.        Modality N/A     FIO2 21 %      Note --     Notified Who OR RN     Notified By [id number]     Notified Time 10/20/2018 20:12    Blood Gas, Arterial, Cord [[id number]]  (Abnormal) Collected:  10/20/18 2005    Specimen:  Cord Blood Arterial from Umbilical Cord Updated:  10/20/18 2006     Site Umbilical     pH, Cord Arterial 7.10 (C) pH Units      pCO2, Cord Arterial 60.6 (H) mmHg      pO2, Cord Arterial 12.2 mmHg      HCO3, Cord Arterial 18.8 mmol/L      Base Exc, Cord Arterial -11.5 (L) mmol/L      O2 Sat, Cord Arterial 14.4 %      Hemoglobin, Blood Gas 13.9 (L) g/dL      Temperature 37.0 C      Barometric Pressure for Blood Gas -- mmHg      Comment: PATM not performed at this facility.        Modality Room Air     FIO2 21 %      Note --     Notified Who or RN     Notified By [id number]     Notified Time 10/20/2018 20:09    Blood Gas, Arterial With Co-Ox [[id number]]  (Abnormal) Collected:  10/20/18 1946    Specimen:  Arterial Blood Updated:  10/20/18 1946     Delano's Test N/A     pH, Arterial 7.332 (L) pH units      pCO2, Arterial 44.9 mm Hg      pO2, Arterial 24.9 (C) mm Hg      HCO3, Arterial 23.7 mmol/L      Base Excess, Arterial -2.5 (L) mmol/L      Hemoglobin, Blood Gas 17.0 g/dL      Hematocrit, Blood Gas 52.2 %      Oxyhemoglobin 52.0 (L) %      Methemoglobin 1.50 %      Carboxyhemoglobin 1.2 %      Temperature 37.0 C      FIO2 21 %      Note --     Notified Who or RN     Notified By [id number]     Notified Time 10/20/2018 19:49     pH, Temp Corrected 7.332 pH Units      pCO2, Temperature Corrected 44.9 mm Hg      pO2, Temperature Corrected 24.9 (L) mm Hg     Blood Gas, Arterial With Co-Ox [[id number]]  (Abnormal) Collected:  10/20/18 1943    Specimen:  Arterial Blood Updated:  10/20/18 1943     Delano's Test N/A     pH, Arterial 7.274 (L) pH units      pCO2, Arterial 54.8 (H) mm Hg      pO2, Arterial 14.6 (C) mm Hg      HCO3, Arterial  25.4 mmol/L      Base Excess, Arterial -2.6 (L) mmol/L      Hemoglobin, Blood Gas 16.7 g/dL      Hematocrit, Blood Gas 51.2 %      Oxyhemoglobin 21.4 (C) %      Methemoglobin 2.00 (H) %      Carboxyhemoglobin 1.2 %      Temperature 37.0 C      FIO2 21 %      Note --     Notified Who or RN     Notified By [id number]     Notified Time 10/20/2018 19:46     pH, Temp Corrected 7.274 pH Units      pCO2, Temperature Corrected 54.8 (H) mm Hg      pO2, Temperature Corrected 14.6 (L) mm Hg     Protein, Urine, 24 Hour - Urine, Clean Catch [[id number]]  (Abnormal) Collected:  10/19/18 1630    Specimen:  24 Hour Urine from Urine, Clean Catch Updated:  10/20/18 0838     Protein, 24H Urine 954.0 (H) mg/24hours      Total Protein, Urine 18.0 (H) mg/dL      24H Urine Volume 5,300 mL      Time (Hours) 24 hrs     Narrative:       Reference ranges are based on a 24 hour period, interperet results accordingly.    Creatinine Clearance - Urine, Clean Catch [[id number]] Collected:  10/18/18 1547    Specimen:  24 Hour Urine from Urine, Clean Catch Updated:  10/20/18 0837    Narrative:       The following orders were created for panel order Creatinine Clearance - Urine, Clean Catch.  Procedure                               Abnormality         Status                     ---------                               -----------         ------                     Creatinine clearance serum[[id number]]   Normal              Final result               Creatinine Clearance, Ur...[[id number]]  Abnormal            Final result                 Please view results for these tests on the individual orders.    Creatinine Clearance, Urine, 24 Hour - Urine, Clean Catch [[id number]]  (Abnormal) Collected:  10/19/18 1630    Specimen:  24 Hour Urine from Urine, Clean Catch Updated:  10/20/18 0837     Creatinine Clearance 83.6 (L) ml/min      Creatinine, Urine 19.8 mg/dL      Time (Hours) 24 hrs      Creatinine, 24H 1.05 g/24 hr      CREATININE CLEARANCE L/24 HOUR 120.4 (L)  L/24 hr     Preeclampsia Panel [[id number]]  (Abnormal) Collected:  10/20/18 0546    Specimen:  Blood Updated:  10/20/18 0632     Alkaline Phosphatase 269 (H) U/L      ALT (SGPT) 17 U/L      AST (SGOT) 29 U/L      Creatinine 0.75 mg/dL      Total Bilirubin 0.2 (L) mg/dL       (H) U/L      Uric Acid 6.1 mg/dL     CBC (No Diff) [[id number]]  (Abnormal) Collected:  10/20/18 0546    Specimen:  Blood Updated:  10/20/18 0612     WBC 10.95 (H) 10*3/mm3      RBC 4.04 10*6/mm3      Hemoglobin 11.1 (L) g/dL      Hematocrit 35.2 %      MCV 87.1 fL      MCH 27.5 pg      MCHC 31.5 (L) g/dL      RDW 14.5 %      RDW-SD 45.8 fl      MPV 12.0 fL      Platelets 193 10*3/mm3     Urinalysis, Microscopic Only - Urine, Clean Catch [[id number]]  (Abnormal) Collected:  10/18/18 1648    Specimen:  Urine from Urine, Clean Catch Updated:  10/18/18 1752     RBC, UA 0-2 /HPF      WBC, UA 6-12 (A) /HPF      Bacteria, UA 1+ (A) /HPF      Squamous Epithelial Cells, UA 7-12 (A) /HPF      Hyaline Casts, UA 0-6 /LPF      Methodology Manual Light Microscopy    Urinalysis With Microscopic If Indicated (No Culture) - Urine, Clean Catch [[id number]]  (Abnormal) Collected:  10/18/18 1648    Specimen:  Urine from Urine, Clean Catch Updated:  10/18/18 1752     Color, UA Yellow     Appearance, UA Clear     pH, UA 5.5     Specific Gravity, UA 1.014     Glucose, UA Negative     Ketones, UA Negative     Bilirubin, UA Negative     Blood, UA Negative     Protein,  mg/dL (2+) (A)     Leuk Esterase, UA Trace (A)     Nitrite, UA Negative     Urobilinogen, UA 0.2 E.U./dL    Creatinine clearance serum [[id number]]  (Normal) Collected:  10/18/18 1547    Specimen:  Blood Updated:  10/18/18 1639     Creatinine 0.77 mg/dL      eGFR Non African Amer 89 mL/min/1.73     Narrative:       [employer] Guidelines    Stage     Description        GFR  1         Normal or High     90+  2         Mild decrease      60-89  3         Moderate decrease  30-59  4          Severe decrease    15-29  5         Kidney failure     <15    The MDRD GFR formula is only valid for adults with stable renal function between ages 18 and 70.    Preeclampsia Panel [[id number]]  (Abnormal) Collected:  10/18/18 1547    Specimen:  Blood Updated:  10/18/18 1639     Alkaline Phosphatase 260 (H) U/L      ALT (SGPT) 10 U/L      AST (SGOT) 19 U/L      Creatinine 0.77 mg/dL      Total Bilirubin 0.2 (L) mg/dL       U/L      Uric Acid 6.1 mg/dL     CBC (No Diff) [[id number]]  (Abnormal) Collected:  10/18/18 1547    Specimen:  Blood Updated:  10/18/18 1619     WBC 8.11 10*3/mm3      RBC 3.91 10*6/mm3      Hemoglobin 10.7 (L) g/dL      Hematocrit 33.1 (L) %      MCV 84.7 fL      MCH 27.4 pg      MCHC 32.3 g/dL      RDW 14.2 %      RDW-SD 43.7 fl      MPV 12.0 fL      Platelets 161 10*3/mm3           Imaging Results (last 7 days)     ** No results found for the last 168 hours. **           Physician Progress Notes (last 7 days) (Notes from 10/17/2018  9:31 AM through 10/24/2018  9:31 AM)      Bari Hughes DO at 10/23/2018  8:57 AM              Postpartum Progress Note    Patient name: Yamilex Carty  YOB: 1990   MRN: [id number]  Referring Provider: Jen Kaplan DO  Admission Date: 10/18/2018  Date of Service: 10/23/2018    ID: 28 y.o.     Diagnosis:   S/p  delivery 3 Days Post-Op   Patient Active Problem List   Diagnosis   • Dichorionic diamniotic twin pregnancy in second trimester   • Pregnancy resulting from assisted reproductive technology in second trimester   • Pregnancy   • Preeclampsia, third trimester   • Other specified hypothyroidism   •  delivery due to maternal disorder, delivered, curr hospitaliz   CC: twins  preeclampsia    Subjective:      No complaints.  lite lochia. + flatus  Ambulating, voiding, tolerating diet.  Pain well controlled.  The patient is currently breastfeeding.   This babies in NICU.    Objective:      Vital  signs:  Vital Signs Range for the last 24 hours  Temperature: Temp:  [98 °F (36.7 °C)-99.2 °F (37.3 °C)] 98.8 °F (37.1 °C)   Temp Source: Temp src: Oral   BP: BP: (133-160)/(72-91) 135/87   Pulse: Heart Rate:  [78-91] 81   Respirations: Resp:  [16-18] 16   Weight:       General: Alert & oriented x4, in no apparent distress  Abdomen: soft, nontender  Uterus: firm, nontender  Incision: clean, dry, intact, dressing clean, suture  Extremities: nontender; trace 1+ edema      Labs:  Lab Results   Component Value Date    WBC 14.04 (H) 10/21/2018    HGB 8.7 (L) 10/21/2018    HCT 28.1 (L) 10/21/2018    MCV 89.5 10/21/2018     10/21/2018       Results from last 7 days  Lab Units 10/21/18  0623   ABO TYPING  O   RH TYPING  Negative     External Prenatal Results     Pregnancy Outside Results - Transcribed From Office Records - See Scanned Records For Details     Test Value Date Time    Hgb 8.7 g/dL (L) 10/21/18 0623    Hct 28.1 % (L) 10/21/18 0623    ABO O  10/21/18 0623    Rh Negative  10/21/18 0623    Antibody Screen Positive  10/18/18 1547    Glucose Fasting GTT       Glucose Tolerance Test 1 hour       Glucose Tolerance Test 3 hour       Gonorrhea (discrete)       Chlamydia (discrete)       RPR       VDRL       Syphilis Antibody       Rubella       HBsAg       Herpes Simplex Virus PCR       Herpes Simplex VIrus Culture       HIV       Hep C RNA Quant PCR       Hep C Antibody       AFP       Group B Strep       GBS Susceptibility to Clindamycin       GBS Susceptibility to Erythromycin       Fetal Fibronectin       Genetic Testing, Maternal Blood             Drug Screening     Test Value Date Time    Urine Drug Screen       Amphetamine Screen       Barbiturate Screen       Benzodiazepine Screen       Methadone Screen       Phencyclidine Screen       Opiates Screen       THC Screen       Cocaine Screen       Propoxyphene Screen       Buprenorphine Screen       Methamphetamine Screen       Oxycodone Screen        Tricyclic Antidepressants Screen                   Assessment/Plan:      3 Days Post-Op s/p Procedure(s):   SECTION PRIMARY  1. POD # 3 after primary LTCS ( one layer closure) Twins 34w2/7 : stable .  2. Severe preeclampsia  B/P stable on labetalol 200 mg q 8 hr, labs stable  3. Patient unable to void markham was anchored   4. Anemia  Mild ( blood loss) 8.7/28  5. Newborns in NICU doing well    PLAN  1. D/C markham,  2. D/C IV  3. Increase activity  4.anticipate D/C tomorrow, F/U PDC or Kaplan one week for B/P assesment  5.D/C suture tomorrow.   Questions were answered.          Electronically signed by Bari Hughes DO at 10/23/2018  9:08 AM     Anthony Wick III, MD at 10/22/2018 10:28 AM        Laborist note: Postoperative day #2 status post  section    Referring provider: Dr. Jen Kaplan    Chief complaint: Severe preeclampsia    S.  Denies headache, scotomata or epigastric pain.  Dizziness has improved since discontinuation of magnesium sulfate.    O.  T = 98.3, P = 78, R = 18, BP = 142/67  Laboratories: No new studies.  Examination:   Chest: Normal air movement.  Left upper lobe wheezing.   Heart: Regular rate and rhythm without murmurs, gallops or rubs.   Abdomen: Transverse skin incision is clean, dry and intact.  Subcuticular suture is in place.   Extremities: Calves nontender.    A.  Doing well.  Blood pressure stable on current antihypertensive regimen.    P.  Ready for transfer to mother-baby.      Electronically signed by Anthony Wick III, MD at 10/22/2018 10:33 AM     Anthony Wick III, MD at 10/21/2018  3:54 PM        Laborist note: Postoperative day #1 status post  section    Referring provider: Dr. Jen Kaplan    Chief complaint: Severe preeclampsia    S.  Patient denies headache, scotomata or epigastric pain.    O.  T = 97.1, P = 78, R = 16, BP = 125/65.  Laboratories: LDH = 375, ALP and AST WNL, hematocrit = 28.1%, platelet =  167,000  Examination: Chest: Clear to auscultation, normal air movement    Heart: Regular rate and rhythm without murmurs, gallops or rubs.    Abdomen: Skin incision is clean, dry and intact with subcuticular suture in place.    Extremities: 1+ edema.  No calf tenderness.    A. Status post  section secondary to severe preeclampsia.  Normotensive at this time.    P. Discontinue magnesium sulfate.  We'll follow on antepartum tonight.  If BP stable, transferred to mother-baby tomorrow.        Electronically signed by Anthony Wick III, MD at 10/21/2018  4:01 PM     Bari Hughes DO at 10/19/2018  8:49 AM          Daily Progress Note    Patient name: Yamilex Carty  YOB: 1990   MRN: [id number]  Admission Date: 10/18/2018  Date of Service: 10/19/2018  Referring Provider: Jen Kaplan DO    Yamilex Carty is a 28 y.o.    at 34w1d  admitted on 10/18/2018 for Preeclampsia, third trimester    Hospital day 1      Diagnoses:   Patient Active Problem List    Diagnosis   • *Preeclampsia, third trimester [O14.93]   • Pregnancy [Z34.90]   • Dichorionic diamniotic twin pregnancy in second trimester [O30.042]   • Pregnancy resulting from assisted reproductive technology in second trimester [O09.812]       Chief Complaint:  Chief Complaint   Patient presents with   • Headache   • fetal growth lag       Subjective:      Yamilex has c/o mild DOYLE today.  Reports fetal movement is normal  Denies leakage of amniotic fluid.  Denies vaginal bleeding    Objective:     Vital signs:  Temp:  [96.1 °F (35.6 °C)-99.3 °F (37.4 °C)] 96.1 °F (35.6 °C)  Heart Rate:  [] 97  Resp:  [14-18] 16  BP: (103-178)/() 161/96    Abdomen: soft, nontender  Uterus: gravid, nontender  Extremities: nontender; 1+ edema        Non-Stress Test:    Fetal Heart Rate Assessment   Method: Fetal HR Assessment Method: external   Beats/min: Fetal HR (beats/min): 135   Baseline: Fetal Heart Baseline Rate: normal  range   Varibility: Fetal HR Variability: moderate (amplitude range 6 to 25 bpm)   Accels: Fetal HR Accelerations: greater than/equal to 15 bpm, lasting at least 15 seconds   Decels: Fetal HR Decelerations: absent   Tracing Category:       Uterine Assessment   Method: Method: external tocotransducer   Frequency (min):     Ctx Count in 10 min:     Duration:     Intensity: Contraction Intensity: no contractions   Intensity by IUPC:     Resting Tone:     Resting Tone by IUPC:     [place] Units:       Cervix: Exam by:     Dilation:     Effacement:     Station:         Medications:    betamethasone acetate-betamethasone sodium phosphate 12 mg Intramuscular Q24H   levothyroxine 50 mcg Oral Q AM   prenatal vitamin 27-0.8 1 tablet Oral Daily   sodium chloride 3 mL Intravenous Q12H      famotidine  •  guaifenesin-dextromethorphan  •  labetalol  •  sodium chloride    Labs:  [unfilled]  Lab Results   Component Value Date    HGB 10.7 (L) 10/18/2018         Assessment/Plan:      Yamilex is a 28 y.o.    at 34w1d Twins DI/DI ( IVF)  1. Preeclampsia, third trimester: severe AEDF  Twin b  2.  obesity  3. Fetal: NST reactive  TID  4. Delivery plan: primary C/S  Tomorrow once in steroid window or sooner  if disease progresses   NPO after MN  .  All questions were answered to the best my ability.    Bari Hughes DO  10/19/2018      Electronically signed by Bari Hughes DO at 10/19/2018  8:54 AM     Milligan, Douglas A, MD at 10/19/2018  8:31 AM          [facility]  Obstetric Progress Note    Chief Complaint:  Chief Complaint   Patient presents with   • Headache   • fetal growth lag       Subjective     Patient:    The patient feels well.      Objective     Vital Signs Range for the last 24 hours  Temp:  [96.1 °F (35.6 °C)-99.3 °F (37.4 °C)] 96.1 °F (35.6 °C)   Temp src: Axillary   BP: (103-178)/() 151/95   Heart Rate:  [] 95   Resp:  [14-18] 16               Weight:  [95.2 kg (209 lb 12.8 oz)]  "95.2 kg (209 lb 12.8 oz)       Flowsheet Rows      First Filed Value   Admission Height  163.8 cm (64.5\") Documented at 10/18/2018 1512   Admission Weight  --          Intake/Output last 24 hours:      Intake/Output Summary (Last 24 hours) at 10/19/18 0831  Last data filed at 10/19/18 0810   Gross per 24 hour   Intake             1500 ml   Output             5050 ml   Net            -3550 ml       Intake/Output this shift:    I/O this shift:  In: -   Out: 800 [Urine:800]    Physical Exam:  General: Patient is comfortable   Heart CVS exam: normal rate and regular rhythm.   Lungs Chest: no tachypnea, retractions or cyanosis.     Abdomen Abdominal exam: uterus is nontender   Extremities Exam of extremities: not examined     Presentation:    Cervix: Exam by:     Dilation:     Effacement:     Station:           Fetal Heart Rate Assessment   Method:     Beats/min:     Baseline:     Varibility:     Accels:     Decels:     Tracing Category:       Uterine Assessment   Method:     Frequency (min):     Ctx Count in 10 min:     Duration:     Intensity:     Intensity by IUPC:     Resting Tone:     Resting Tone by IUPC:     [place] Units:         Assessment/Plan       Preeclampsia, third trimester    Dichorionic diamniotic twin pregnancy in second trimester    Pregnancy resulting from assisted reproductive technology in second trimester    Pregnancy        Assessment:  1.  Intrauterine pregnancy at 34w1d weeks gestation with reactive fetal status.    2.  Preeclampsia  3.  Obstetrical history significant for is non-contributory.  4.  GBS status: No results found for: GBSANTIGEN    Plan:  1. fetal and uterine monitoring  intermittent   With severe preeclampsia and AEDF, recommend delivery in steroid window  2. Plan of care has been reviewed with patient.  3.  Risks, benefits of treatment plan have been discussed.  4.  All questions have been answered.  5.      Douglas A. Milligan, MD  10/19/2018  8:31 AM      Electronically " signed by Milligan, Douglas A, MD at 10/19/2018  8:37 AM       Consult Notes (last 7 days) (Notes from 10/17/18 through 10/24/18)     No notes of this type exist for this encounter.        Skin Assessments (last 3 days)     Date/Time Skin WDL Sensory Perception Moisture Activity Mobility Nutrition Friction and Shear Burt Score    10/21/18 0815 WDL -- -- -- -- -- -- --    10/21/18 2014 WDL 3-->slightly limited 4-->rarely moist 3-->walks occasionally 3-->slightly limited 3-->adequate 3-->no apparent problem 19    10/22/18 0800 -- 3-->slightly limited 4-->rarely moist 3-->walks occasionally 3-->slightly limited 3-->adequate 3-->no apparent problem 19    10/22/18 1100 WDL -- -- -- -- -- -- --    10/22/18 1950 WDL 4-->no impairment 4-->rarely moist 3-->walks occasionally 4-->no limitation 3-->adequate 3-->no apparent problem 21    10/23/18 0816 WDL 4-->no impairment 4-->rarely moist 4-->walks frequently 4-->no limitation 4-->excellent 3-->no apparent problem 23    10/23/18 1500 WDL 4-->no impairment 4-->rarely moist 4-->walks frequently 4-->no limitation 4-->excellent 3-->no apparent problem 23    10/23/18 2040 WDL 4-->no impairment 4-->rarely moist 4-->walks frequently 4-->no limitation 4-->excellent 3-->no apparent problem 23

## 2018-10-29 ENCOUNTER — POSTPARTUM VISIT (OUTPATIENT)
Dept: OBSTETRICS AND GYNECOLOGY | Facility: HOSPITAL | Age: 28
End: 2018-10-29

## 2018-10-29 VITALS — DIASTOLIC BLOOD PRESSURE: 58 MMHG | SYSTOLIC BLOOD PRESSURE: 109 MMHG | BODY MASS INDEX: 31.77 KG/M2 | WEIGHT: 188 LBS

## 2018-10-29 DIAGNOSIS — Z98.891 STATUS POST CESAREAN DELIVERY: Primary | ICD-10-CM

## 2018-10-29 RX ORDER — IBUPROFEN 200 MG
200 TABLET ORAL EVERY 6 HOURS PRN
COMMUNITY
End: 2022-11-16

## 2018-10-29 NOTE — PROGRESS NOTES
Denies problems. States her infants are doing well in the NICU. Incision clean, dry and intact with steri-strips without redness, edema or drainage.

## 2018-10-29 NOTE — PROGRESS NOTES
2 Wk PP    No c/o  BP normal - stop Labetalol  Incision healing well  Ut NT      F/u at 6 wk PP with Dr. Kaplan per patient request

## 2022-11-16 ENCOUNTER — INITIAL PRENATAL (OUTPATIENT)
Dept: OBSTETRICS AND GYNECOLOGY | Facility: CLINIC | Age: 32
End: 2022-11-16

## 2022-11-16 VITALS — BODY MASS INDEX: 37.35 KG/M2 | DIASTOLIC BLOOD PRESSURE: 78 MMHG | SYSTOLIC BLOOD PRESSURE: 122 MMHG | WEIGHT: 221 LBS

## 2022-11-16 DIAGNOSIS — O09.299 HX OF PREECLAMPSIA, PRIOR PREGNANCY, CURRENTLY PREGNANT: ICD-10-CM

## 2022-11-16 DIAGNOSIS — Z01.419 PAP TEST, AS PART OF ROUTINE GYNECOLOGICAL EXAMINATION: Primary | ICD-10-CM

## 2022-11-16 DIAGNOSIS — Z34.91 INITIAL OBSTETRIC VISIT IN FIRST TRIMESTER: ICD-10-CM

## 2022-11-16 PROBLEM — E07.9 DISORDER OF THYROID, ANTEPARTUM: Status: ACTIVE | Noted: 2022-11-16

## 2022-11-16 PROBLEM — Z87.59 HISTORY OF PREGNANCY INDUCED HYPERTENSION: Status: ACTIVE | Noted: 2022-11-16

## 2022-11-16 PROBLEM — Z98.891 STATUS POST CESAREAN DELIVERY: Status: RESOLVED | Noted: 2018-10-29 | Resolved: 2022-11-16

## 2022-11-16 PROBLEM — O14.93 PREECLAMPSIA, THIRD TRIMESTER: Status: RESOLVED | Noted: 2018-10-18 | Resolved: 2022-11-16

## 2022-11-16 PROBLEM — Z98.891 PREVIOUS CESAREAN SECTION: Status: ACTIVE | Noted: 2022-11-16

## 2022-11-16 PROBLEM — E03.8 OTHER SPECIFIED HYPOTHYROIDISM: Status: RESOLVED | Noted: 2018-10-23 | Resolved: 2022-11-16

## 2022-11-16 PROBLEM — O30.042 DICHORIONIC DIAMNIOTIC TWIN PREGNANCY IN SECOND TRIMESTER: Status: RESOLVED | Noted: 2018-07-26 | Resolved: 2022-11-16

## 2022-11-16 PROBLEM — O99.280 DISORDER OF THYROID, ANTEPARTUM: Status: ACTIVE | Noted: 2022-11-16

## 2022-11-16 PROBLEM — O09.812 PREGNANCY RESULTING FROM ASSISTED REPRODUCTIVE TECHNOLOGY IN SECOND TRIMESTER: Status: RESOLVED | Noted: 2018-07-26 | Resolved: 2022-11-16

## 2022-11-16 PROCEDURE — 0501F PRENATAL FLOW SHEET: CPT | Performed by: OBSTETRICS & GYNECOLOGY

## 2022-11-16 NOTE — PROGRESS NOTES
Initial ob visit     CC- Here for care of pregnancy        Yamilex Carty is a 32 y.o. female, , who presents for her first obstetrical visit.  Her last LMP was Patient's last menstrual period was 2022.. Her LISS is 2023, by Ultrasound. Current GA is 11w0d.     Initial positive test date : 10/28/2022, UPT and HCG        Her periods are: every 28 days  Prior obstetric issues: pre-eclampsia,  labor  Patient's past medical history is significant for: thyroid dysfunction. She is currently taking levothroxine for control. She follows with [facility] for treatment.  Family history of genetic issues (includes FOB): denies  Prior infections concerning in pregnancy (Rash, fever in last 2 weeks): No  Varicella Hx - history of chicken pox  Prior testing for Cystic Fibrosis Carrier or Sickle Cell Trait-denies  Prepregnancy BMI - Body mass index is 37.35 kg/m².  History of STD: no  Hx of HSV for patient or partner: no  Ultrasound Today: yes    OB History    Para Term  AB Living   2 1 0 1 0 2   SAB IAB Ectopic Molar Multiple Live Births   0 0 0 0 1 2      # Outcome Date GA Lbr Hay/2nd Weight Sex Delivery Anes PTL Lv   2 Current            1A  10/20/18 34w2d  1960 g (4 lb 5.1 oz) M CS-LTranv Spinal N MARTHA   1B  10/20/18 34w2d  1770 g (3 lb 14.4 oz) F CS-LTranv Spinal N MARTHA      Obstetric Comments   Pregnancy #1 - IVF conception; fresh cycle; no donors.        Additional Pertinent History   Last Pap : 2020 Result: negative HPV: unknown      Last Completed Pap Smear     This patient has no relevant Health Maintenance data.        History of abnormal Pap smear: no  Family history of uterine, colon, breast, or ovarian cancer: no  Feelings of Anxiety or Depression: yes - self controlled  Tobacco Usage?: No   Alcohol/Drug Use?: NO  Over the age of 35 at delivery: no  Desires Genetic Screening: yes materni 21  Flu Status: Already given in current flu  season    PMH    Current Outpatient Medications:   •  levothyroxine (SYNTHROID, LEVOTHROID) 50 MCG tablet, Take 50 mcg by mouth Daily., Disp: , Rfl:   •  Prenatal Vit-Fe Fumarate-FA (PRENATAL 27) 27-1 MG tablet tablet, Take 1 tablet by mouth Daily., Disp: , Rfl:      Past Medical History:   Diagnosis Date   • Female infertility unexplained after evaluation    • History of anxiety     stopped meds    • Hypothyroidism 2017        Past Surgical History:   Procedure Laterality Date   •  SECTION N/A 10/20/2018    Procedure:  SECTION PRIMARY;  Surgeon: Bari Hughes DO;  Location: [facility] DELIVERY;  Service: Obstetrics/Gynecology   • ENDOSCOPY AND COLONOSCOPY     • FERTILITY SURGERY  2018    Oocyte Retrieval   • TONSILLECTOMY AND ADENOIDECTOMY     • WISDOM TOOTH EXTRACTION         Review of Systems   Review of Systems  Patient Reports: Nausea  Patient Denies: Spotting, Heavy bleeding and Cramping  All systems reviewed and otherwise normal.    I have reviewed and agree with the HPI, ROS, and historical information as entered above. Mile Reeves MD    /78   Wt 100 kg (221 lb)   LMP 2022   BMI 37.35 kg/m²     The additional following portions of the patient's history were reviewed and updated as appropriate: allergies, current medications, past family history, past medical history, past social history, past surgical history and problem list.    Physical Exam  General:  well developed; well nourished  no acute distress   Chest/Respiratory: No labored breathing, normal respiratory effort, normal appearance, no respiratory noises noted   Heart:  not examined   Thyroid:  not examined   Breasts:  Not performed.   Abdomen: soft, non-tender; no masses  no umbilical or inguinal hernias are present  no hepato-splenomegaly   Pelvis: Clinical staff was present for exam  External genitalia:  normal appearance of the external genitalia including Bartholin's and Culebra's  glands.  :  urethral meatus normal;  Vaginal:  normal pink mucosa without prolapse or lesions.  Cervix:  normal appearance.        Assessment and Plan    Problem List Items Addressed This Visit    None  Visit Diagnoses     Pap test, as part of routine gynecological examination    -  Primary    Relevant Orders    LIQUID-BASED PAP SMEAR, [facility] ([facility])    Obstetric Panel    HIV-1 / O / 2 Ag / Antibody 4th Generation    Urinalysis With Microscopic - Urine, Clean Catch    Urine Culture - Urine, Urine, Clean Catch    Urine Drug Screen - Urine, Clean Catch    Initial obstetric visit in first trimester        Relevant Orders    Obstetric Panel    HIV-1 / O / 2 Ag / Antibody 4th Generation    Urinalysis With Microscopic - Urine, Clean Catch    Urine Culture - Urine, Urine, Clean Catch    Urine Drug Screen - Urine, Clean Catch    [id number] PLUS Core - Blood,    Hx of preeclampsia, prior pregnancy, currently pregnant        Relevant Orders    Protein, Urine, 24 Hour - Urine, Clean Catch    TSH          1. Pregnancy at 11w0d  2. Reviewed routine prenatal care with the office and educational materials given  3. Lab(s) Ordered  4. Discussed options for genetic testing including first trimester nuchal translucency screen, genetic disease carrier testing, quadruple screen, and [place].  5. Patient is on Prenatal vitamins  6. Activity recommendation : 150 minutes/week of moderate intensity aerobic activity unless we limit for bleeding, hypertension or other pregnancy complication   7. discussed baby aspirin from 10-36 weeks for prevention of preeclampsia   8. baseline 24 hour urine protein recommended. Instructions and equipment given to return sample at next visit.  9. baseline PEP today  10. recent TSH wnl per patient   Return in about 1 month (around 12/16/2022) for F/U Prenatal.      Mile Reeves MD  11/16/2022

## 2022-11-18 LAB — REF LAB TEST METHOD: NORMAL

## 2022-11-21 LAB
ABO GROUP BLD: NORMAL
AMPHETAMINES UR QL SCN: NEGATIVE NG/ML
APPEARANCE UR: CLEAR
BACTERIA #/AREA URNS HPF: NORMAL /[HPF]
BACTERIA UR CULT: NORMAL
BACTERIA UR CULT: NORMAL
BARBITURATES UR QL SCN: NEGATIVE NG/ML
BASOPHILS # BLD AUTO: 0 X10E3/UL (ref 0–0.2)
BASOPHILS NFR BLD AUTO: 0 %
BENZODIAZ UR QL SCN: NEGATIVE NG/ML
BILIRUB UR QL STRIP: NEGATIVE
BLD GP AB SCN SERPL QL: NEGATIVE
BZE UR QL SCN: NEGATIVE NG/ML
CANNABINOIDS UR QL SCN: NEGATIVE NG/ML
CASTS URNS QL MICRO: NORMAL /LPF
CFDNA.FET/CFDNA.TOTAL SFR FETUS: NORMAL %
CITATION REF LAB TEST: NORMAL
COLOR UR: YELLOW
CREAT UR-MCNC: 58.8 MG/DL (ref 20–300)
EOSINOPHIL # BLD AUTO: 0 X10E3/UL (ref 0–0.4)
EOSINOPHIL NFR BLD AUTO: 0 %
EPI CELLS #/AREA URNS HPF: NORMAL /HPF (ref 0–10)
ERYTHROCYTE [DISTWIDTH] IN BLOOD BY AUTOMATED COUNT: 13.4 % (ref 11.7–15.4)
FET 13+18+21+X+Y ANEUP PLAS.CFDNA: NEGATIVE
FET CHR 21 TS PLAS.CFDNA QL: NEGATIVE
FET SEX PLAS.CFDNA DOSAGE CFDNA: NORMAL
FET TS 13 RISK PLAS.CFDNA QL: NEGATIVE
FET TS 18 RISK WBC.DNA+CFDNA QL: NEGATIVE
GA EST FROM CONCEPTION DATE: NORMAL D
GESTATIONAL AGE > 9:: YES
GLUCOSE UR QL STRIP: NEGATIVE
HBV SURFACE AG SERPL QL IA: NEGATIVE
HCT VFR BLD AUTO: 35.9 % (ref 34–46.6)
HCV AB S/CO SERPL IA: <0.1 S/CO RATIO (ref 0–0.9)
HGB BLD-MCNC: 12.1 G/DL (ref 11.1–15.9)
HGB UR QL STRIP: NEGATIVE
HIV 1+2 AB+HIV1 P24 AG SERPL QL IA: NON REACTIVE
IMM GRANULOCYTES # BLD AUTO: 0 X10E3/UL (ref 0–0.1)
IMM GRANULOCYTES NFR BLD AUTO: 0 %
KETONES UR QL STRIP: NEGATIVE
LAB DIRECTOR NAME PROVIDER: NORMAL
LAB DIRECTOR NAME PROVIDER: NORMAL
LABORATORY COMMENT REPORT: NORMAL
LABORATORY COMMENT REPORT: NORMAL
LEUKOCYTE ESTERASE UR QL STRIP: ABNORMAL
LIMITATIONS OF THE TEST: NORMAL
LYMPHOCYTES # BLD AUTO: 1.1 X10E3/UL (ref 0.7–3.1)
LYMPHOCYTES NFR BLD AUTO: 15 %
MCH RBC QN AUTO: 29 PG (ref 26.6–33)
MCHC RBC AUTO-ENTMCNC: 33.7 G/DL (ref 31.5–35.7)
MCV RBC AUTO: 86 FL (ref 79–97)
METHADONE UR QL SCN: NEGATIVE NG/ML
MICRO URNS: ABNORMAL
MONOCYTES # BLD AUTO: 0.4 X10E3/UL (ref 0.1–0.9)
MONOCYTES NFR BLD AUTO: 5 %
NEGATIVE PREDICTIVE VALUE: NORMAL
NEUTROPHILS # BLD AUTO: 6.2 X10E3/UL (ref 1.4–7)
NEUTROPHILS NFR BLD AUTO: 80 %
NITRITE UR QL STRIP: NEGATIVE
NOTE: NORMAL
OPIATES UR QL SCN: NEGATIVE NG/ML
OXYCODONE+OXYMORPHONE UR QL SCN: NEGATIVE NG/ML
PCP UR QL: NEGATIVE NG/ML
PERFORMANCE CHARACTERISTICS: NORMAL
PH UR STRIP: 7 [PH] (ref 5–7.5)
PH UR: 7.2 [PH] (ref 4.5–8.9)
PLATELET # BLD AUTO: 241 X10E3/UL (ref 150–450)
POSITIVE PREDICTIVE VALUE: NORMAL
PROPOXYPH UR QL SCN: NEGATIVE NG/ML
PROT UR QL STRIP: NEGATIVE
RBC # BLD AUTO: 4.17 X10E6/UL (ref 3.77–5.28)
RBC #/AREA URNS HPF: NORMAL /HPF (ref 0–2)
REF LAB TEST METHOD: NORMAL
RH BLD: NEGATIVE
RPR SER QL: NON REACTIVE
RUBV IGG SERPL IA-ACNC: 4.27 INDEX
SP GR UR STRIP: 1.01 (ref 1–1.03)
TEST PERFORMANCE INFO SPEC: NORMAL
TSH SERPL DL<=0.005 MIU/L-ACNC: 11.7 UIU/ML (ref 0.45–4.5)
UROBILINOGEN UR STRIP-MCNC: 0.2 MG/DL (ref 0.2–1)
WBC # BLD AUTO: 7.8 X10E3/UL (ref 3.4–10.8)
WBC #/AREA URNS HPF: NORMAL /HPF (ref 0–5)

## 2022-11-23 ENCOUNTER — TELEPHONE (OUTPATIENT)
Dept: OBSTETRICS AND GYNECOLOGY | Facility: CLINIC | Age: 32
End: 2022-11-23

## 2022-11-23 NOTE — TELEPHONE ENCOUNTER
Patient called asking results of M21 testing and asked if we could call her spouse to give gender. Patient asking for us to call her spouse, Bubba at [phone]. Advised patient that I would call and let him know. Patient V/U.

## 2022-12-15 ENCOUNTER — ROUTINE PRENATAL (OUTPATIENT)
Dept: OBSTETRICS AND GYNECOLOGY | Facility: CLINIC | Age: 32
End: 2022-12-15

## 2022-12-15 VITALS — SYSTOLIC BLOOD PRESSURE: 120 MMHG | DIASTOLIC BLOOD PRESSURE: 70 MMHG | BODY MASS INDEX: 37.11 KG/M2 | WEIGHT: 219.6 LBS

## 2022-12-15 DIAGNOSIS — Z34.90 ENCOUNTER FOR SUPERVISION OF NORMAL PREGNANCY, ANTEPARTUM, UNSPECIFIED GRAVIDITY: ICD-10-CM

## 2022-12-15 DIAGNOSIS — Z98.891 PREVIOUS CESAREAN SECTION: ICD-10-CM

## 2022-12-15 DIAGNOSIS — O99.280 DISORDER OF THYROID, ANTEPARTUM: ICD-10-CM

## 2022-12-15 DIAGNOSIS — E07.9 DISORDER OF THYROID, ANTEPARTUM: ICD-10-CM

## 2022-12-15 DIAGNOSIS — Z3A.15 15 WEEKS GESTATION OF PREGNANCY: ICD-10-CM

## 2022-12-15 DIAGNOSIS — Z87.59 HISTORY OF PREGNANCY INDUCED HYPERTENSION: ICD-10-CM

## 2022-12-15 DIAGNOSIS — O09.299 HX OF PREECLAMPSIA, PRIOR PREGNANCY, CURRENTLY PREGNANT: Primary | ICD-10-CM

## 2022-12-15 LAB
GLUCOSE UR STRIP-MCNC: NEGATIVE MG/DL
PROT UR STRIP-MCNC: NEGATIVE MG/DL

## 2022-12-15 PROCEDURE — 0502F SUBSEQUENT PRENATAL CARE: CPT | Performed by: OBSTETRICS & GYNECOLOGY

## 2022-12-15 RX ORDER — LEVOTHYROXINE SODIUM 0.12 MG/1
125 TABLET ORAL DAILY
COMMUNITY

## 2022-12-16 LAB
PROT 24H UR-MRATE: 95 MG/24 HR (ref 30–150)
PROT UR-MCNC: 7.3 MG/DL

## 2022-12-17 LAB
AFP INTERP SERPL-IMP: NORMAL
AFP INTERP SERPL-IMP: NORMAL
AFP MOM SERPL: 0.8
AFP SERPL-MCNC: 19.7 NG/ML
AGE AT DELIVERY: 33.1 YR
GA METHOD: NORMAL
GA: 15.1 WEEKS
IDDM PATIENT QL: NO
LABORATORY COMMENT REPORT: NORMAL
MULTIPLE PREGNANCY: NO
NEURAL TUBE DEFECT RISK FETUS: NORMAL %
RESULT: NORMAL
TSH SERPL DL<=0.005 MIU/L-ACNC: 7.33 UIU/ML (ref 0.45–4.5)

## 2023-01-19 ENCOUNTER — ROUTINE PRENATAL (OUTPATIENT)
Dept: OBSTETRICS AND GYNECOLOGY | Facility: CLINIC | Age: 33
End: 2023-01-19
Payer: COMMERCIAL

## 2023-01-19 VITALS — WEIGHT: 220.2 LBS | DIASTOLIC BLOOD PRESSURE: 70 MMHG | SYSTOLIC BLOOD PRESSURE: 120 MMHG | BODY MASS INDEX: 37.21 KG/M2

## 2023-01-19 DIAGNOSIS — O99.280 DISORDER OF THYROID, ANTEPARTUM: ICD-10-CM

## 2023-01-19 DIAGNOSIS — E07.9 DISORDER OF THYROID, ANTEPARTUM: ICD-10-CM

## 2023-01-19 DIAGNOSIS — Z98.891 PREVIOUS CESAREAN SECTION: ICD-10-CM

## 2023-01-19 DIAGNOSIS — Z34.82 PRENATAL CARE, SUBSEQUENT PREGNANCY, SECOND TRIMESTER: Primary | ICD-10-CM

## 2023-01-19 DIAGNOSIS — Z3A.20 20 WEEKS GESTATION OF PREGNANCY: ICD-10-CM

## 2023-01-19 DIAGNOSIS — Z87.59 HISTORY OF PREGNANCY INDUCED HYPERTENSION: ICD-10-CM

## 2023-01-19 LAB
GLUCOSE UR STRIP-MCNC: NEGATIVE MG/DL
PROT UR STRIP-MCNC: NEGATIVE MG/DL

## 2023-01-19 PROCEDURE — 0502F SUBSEQUENT PRENATAL CARE: CPT | Performed by: OBSTETRICS & GYNECOLOGY

## 2023-01-19 NOTE — PROGRESS NOTES
OB FOLLOW UP  CC- Here for care of pregnancy        Yamilex Carty is a 32 y.o.  20w1d patient being seen today for her obstetrical follow up visit. Patient reports no complaints..     Her prenatal care is complicated by (and status) : None  Patient Active Problem List   Diagnosis   • Pregnancy   • Previous  section   • History of pregnancy induced hypertension   • Disorder of thyroid, antepartum       Flu Status: Already given in current flu season  Ultrasound Today: Yes    ROS -   Patient Reports : No Problems  Patient Denies: Loss of Fluid, Vaginal Spotting, Vision Changes, Headaches, Nausea  and Vomiting   Fetal Movement : absent  All other systems reviewed and are negative.       The additional following portions of the patient's history were reviewed and updated as appropriate: allergies, current medications, past family history, past medical history, past social history, past surgical history and problem list.    I have reviewed and agree with the HPI, ROS, and historical information as entered above. Mile Reeves MD    /70   Wt 99.9 kg (220 lb 3.2 oz)   LMP 2022   BMI 37.21 kg/m²       EXAM:     Prenatal Vitals  BP: 120/70  Weight: 99.9 kg (220 lb 3.2 oz)   Fetal Heart Rate: 163          Urine Glucose Read-only: Negative  Urine Protein Read-only: Negative       Assessment and Plan    Problem List Items Addressed This Visit     Pregnancy    Overview     cfDNA low risk, AFP         Previous  section    Overview     Desires repeat         History of pregnancy induced hypertension    Overview     Preeclampsia 34 wks, with twins  Baseline PEP, 24h urine  Baby asa 12 wks         Disorder of thyroid, antepartum    Overview     Followed by PCP  TSH prior to nob 12, recheck nob 11- synthroid increased         Relevant Medications    levothyroxine (SYNTHROID, LEVOTHROID) 125 MCG tablet   Other Visit Diagnoses     Prenatal care, subsequent pregnancy, second trimester    -   Primary    Relevant Orders    POC Urinalysis Dipstick (Completed)          1. Pregnancy at 20w1d  2. Anatomy scan today is complete and appear within normal limits.  3. Fetal status reassuring.   4. Activity and Exercise discussed.  5. Patient is on Prenatal vitamins  Return in about 1 month (around 2/19/2023) for F/U Prenatal.    Mile Reeves MD  01/19/2023

## 2023-02-16 ENCOUNTER — ROUTINE PRENATAL (OUTPATIENT)
Dept: OBSTETRICS AND GYNECOLOGY | Facility: CLINIC | Age: 33
End: 2023-02-16
Payer: COMMERCIAL

## 2023-02-16 VITALS — SYSTOLIC BLOOD PRESSURE: 118 MMHG | DIASTOLIC BLOOD PRESSURE: 60 MMHG | BODY MASS INDEX: 37.45 KG/M2 | WEIGHT: 221.6 LBS

## 2023-02-16 DIAGNOSIS — Z34.82 PRENATAL CARE, SUBSEQUENT PREGNANCY, SECOND TRIMESTER: Primary | ICD-10-CM

## 2023-02-16 DIAGNOSIS — O99.280 DISORDER OF THYROID, ANTEPARTUM: ICD-10-CM

## 2023-02-16 DIAGNOSIS — E07.9 DISORDER OF THYROID, ANTEPARTUM: ICD-10-CM

## 2023-02-16 LAB
CLARITY, POC: CLEAR
COLOR UR: YELLOW
GLUCOSE UR STRIP-MCNC: NEGATIVE MG/DL
PROT UR STRIP-MCNC: NEGATIVE MG/DL

## 2023-02-16 PROCEDURE — 0502F SUBSEQUENT PRENATAL CARE: CPT | Performed by: NURSE PRACTITIONER

## 2023-02-16 RX ORDER — ASPIRIN 81 MG/1
TABLET ORAL
COMMUNITY

## 2023-02-16 NOTE — PROGRESS NOTES
OB FOLLOW UP  CC- Here for care of pregnancy        Yamilex Carty is a 32 y.o.  24w1d patient being seen today for her obstetrical follow up visit. Patient reports fatigue and occasional swelling in her bilateral lower extremities (non-pitting per patient) after working.  labor precautions and preeclampsia precautions discussed. Glucose testing and rhogam to be done next visit.     Her prenatal care is complicated by (and status) :   Patient Active Problem List   Diagnosis   • Pregnancy   • Previous  section   • History of pregnancy induced hypertension   • Disorder of thyroid, antepartum       Flu Status: Already given in current flu season  Ultrasound Today: No    ROS -   Patient Denies: Loss of Fluid, Vaginal Spotting, Vision Changes, Headaches, Nausea , Vomiting , Contractions and Epigastric pain  Fetal Movement : normal  All other systems reviewed and are negative.       The additional following portions of the patient's history were reviewed and updated as appropriate: allergies, current medications, past family history, past medical history, past social history, past surgical history and problem list.    I have reviewed and agree with the HPI, ROS, and historical information as entered above. Maritza Tran, APRN    /60   Wt 101 kg (221 lb 9.6 oz)   LMP 2022   BMI 37.45 kg/m²       EXAM:     Prenatal Vitals  BP: 118/60  Weight: 101 kg (221 lb 9.6 oz)   Fetal Heart Rate: 150               Urine Glucose Read-only: Negative  Urine Protein Read-only: Negative       Assessment and Plan    Problem List Items Addressed This Visit        Gravid and     Disorder of thyroid, antepartum    Overview     Followed by PCP  TSH prior to nob 12, recheck nob 11- synthroid increased         Relevant Medications    levothyroxine (SYNTHROID, LEVOTHROID) 125 MCG tablet    Other Relevant Orders    TSH   Other Visit Diagnoses     Prenatal care, subsequent pregnancy,  second trimester    -  Primary    Relevant Orders    POC Urinalysis Dipstick (Completed)          1. Pregnancy at 24w1d  2. Repeat TSH today  3. Fetal status reassuring.  4. 1 hour gtt, CBC, Antibody screen and TDAP next visit. Instructions given  5. Fetal movement/PTL or Labor precautions  6. Reviewed routine prenatal care with the office and educational materials given  7. Discussed/encouraged TDAP vaccination after 28 weeks  8. Activity and Exercise discussed.  9. FU in about 4 weeks 3/16/23 with ALYSSA Tran, APRN  02/16/2023

## 2023-02-17 LAB — TSH SERPL DL<=0.005 MIU/L-ACNC: 3.6 UIU/ML (ref 0.27–4.2)

## 2023-03-16 ENCOUNTER — ROUTINE PRENATAL (OUTPATIENT)
Dept: OBSTETRICS AND GYNECOLOGY | Facility: CLINIC | Age: 33
End: 2023-03-16
Payer: COMMERCIAL

## 2023-03-16 VITALS — SYSTOLIC BLOOD PRESSURE: 110 MMHG | WEIGHT: 227 LBS | DIASTOLIC BLOOD PRESSURE: 62 MMHG | BODY MASS INDEX: 38.36 KG/M2

## 2023-03-16 DIAGNOSIS — O99.280 DISORDER OF THYROID, ANTEPARTUM: ICD-10-CM

## 2023-03-16 DIAGNOSIS — Z34.93 PRENATAL CARE IN THIRD TRIMESTER: Primary | ICD-10-CM

## 2023-03-16 DIAGNOSIS — Z87.59 HISTORY OF PREGNANCY INDUCED HYPERTENSION: ICD-10-CM

## 2023-03-16 DIAGNOSIS — Z3A.28 28 WEEKS GESTATION OF PREGNANCY: ICD-10-CM

## 2023-03-16 DIAGNOSIS — O26.899 RH NEGATIVE, ANTEPARTUM: ICD-10-CM

## 2023-03-16 DIAGNOSIS — Z98.891 PREVIOUS CESAREAN SECTION: ICD-10-CM

## 2023-03-16 DIAGNOSIS — E07.9 DISORDER OF THYROID, ANTEPARTUM: ICD-10-CM

## 2023-03-16 DIAGNOSIS — Z67.91 RH NEGATIVE, ANTEPARTUM: ICD-10-CM

## 2023-03-16 LAB
GLUCOSE UR STRIP-MCNC: NEGATIVE MG/DL
PROT UR STRIP-MCNC: NEGATIVE MG/DL

## 2023-03-16 PROCEDURE — 0502F SUBSEQUENT PRENATAL CARE: CPT | Performed by: OBSTETRICS & GYNECOLOGY

## 2023-03-16 PROCEDURE — 96372 THER/PROPH/DIAG INJ SC/IM: CPT | Performed by: OBSTETRICS & GYNECOLOGY

## 2023-03-17 ENCOUNTER — TELEPHONE (OUTPATIENT)
Dept: OBSTETRICS AND GYNECOLOGY | Facility: CLINIC | Age: 33
End: 2023-03-17
Payer: COMMERCIAL

## 2023-03-17 LAB
BLD GP AB SCN SERPL QL: NEGATIVE
ERYTHROCYTE [DISTWIDTH] IN BLOOD BY AUTOMATED COUNT: 13.2 % (ref 12.3–15.4)
GLUCOSE 1H P 50 G GLC PO SERPL-MCNC: 128 MG/DL (ref 65–139)
HCT VFR BLD AUTO: 33.5 % (ref 34–46.6)
HGB BLD-MCNC: 11.3 G/DL (ref 12–15.9)
MCH RBC QN AUTO: 28.8 PG (ref 26.6–33)
MCHC RBC AUTO-ENTMCNC: 33.7 G/DL (ref 31.5–35.7)
MCV RBC AUTO: 85.5 FL (ref 79–97)
PLATELET # BLD AUTO: 222 10*3/MM3 (ref 140–450)
RBC # BLD AUTO: 3.92 10*6/MM3 (ref 3.77–5.28)
WBC # BLD AUTO: 11.26 10*3/MM3 (ref 3.4–10.8)

## 2023-03-17 NOTE — TELEPHONE ENCOUNTER
Pt notified her 1hGTT was normal. She is slightly anemic, recommended a Slow Fe 45mg iron at a different time than her prenatal. Pt VU

## 2023-04-17 ENCOUNTER — ROUTINE PRENATAL (OUTPATIENT)
Dept: OBSTETRICS AND GYNECOLOGY | Facility: CLINIC | Age: 33
End: 2023-04-17
Payer: COMMERCIAL

## 2023-04-17 VITALS — DIASTOLIC BLOOD PRESSURE: 80 MMHG | SYSTOLIC BLOOD PRESSURE: 124 MMHG | BODY MASS INDEX: 40.56 KG/M2 | WEIGHT: 240 LBS

## 2023-04-17 DIAGNOSIS — Z3A.32 32 WEEKS GESTATION OF PREGNANCY: ICD-10-CM

## 2023-04-17 DIAGNOSIS — E07.9 DISORDER OF THYROID, ANTEPARTUM: ICD-10-CM

## 2023-04-17 DIAGNOSIS — Z87.59 HISTORY OF PREGNANCY INDUCED HYPERTENSION: ICD-10-CM

## 2023-04-17 DIAGNOSIS — Z67.91 RH NEGATIVE, ANTEPARTUM: ICD-10-CM

## 2023-04-17 DIAGNOSIS — O99.280 DISORDER OF THYROID, ANTEPARTUM: ICD-10-CM

## 2023-04-17 DIAGNOSIS — Z34.93 PRENATAL CARE IN THIRD TRIMESTER: Primary | ICD-10-CM

## 2023-04-17 DIAGNOSIS — Z98.891 PREVIOUS CESAREAN SECTION: ICD-10-CM

## 2023-04-17 DIAGNOSIS — O26.899 RH NEGATIVE, ANTEPARTUM: ICD-10-CM

## 2023-04-17 LAB
EXPIRATION DATE: 0
GLUCOSE UR STRIP-MCNC: NEGATIVE MG/DL
Lab: 0
PROT UR STRIP-MCNC: NEGATIVE MG/DL

## 2023-04-17 RX ORDER — UREA 10 %
LOTION (ML) TOPICAL
COMMUNITY

## 2023-04-17 NOTE — PROGRESS NOTES
OB FOLLOW UP  CC- Here for care of pregnancy        Yamilex Carty is a 33 y.o.  32w5d patient being seen today for her obstetrical follow up visit. Patient reports swelling in the upper and lower extremities it is Pitting, headache that is relieved by Tylenol or rest , heartburn she is taking OTC medication for treatment currently, numbness in both hands but right worse than left. , nausea without vomiting  and vaginal pressure/pain..     Her prenatal care is complicated by (and status) :    Patient Active Problem List   Diagnosis   • Pregnancy   • Previous  section   • History of pregnancy induced hypertension   • Disorder of thyroid, antepartum   • Rh negative, antepartum         TDAP status: received at last visit  Rhogam status: was given  28 week labs: Reviewed, normal and Labs show anemia. She is taking additional iron supplement.  Ultrasound Today: No  Non Stress Test: No.     ROS -   Patient Reports : see above  Patient Denies: Loss of Fluid, Vaginal Spotting, Vision Changes, Vomiting , Contractions and Epigastric pain  Fetal Movement : normal  All other systems reviewed and are negative.       The additional following portions of the patient's history were reviewed and updated as appropriate: allergies and current medications.    I have reviewed and agree with the HPI, ROS, and historical information as entered above. Mile Reeves MD    /80   Wt 109 kg (240 lb)   LMP 2022   BMI 40.56 kg/m²       EXAM:     Prenatal Vitals  BP: 124/80  Weight: 109 kg (240 lb)   Fetal Heart Rate: +      Fundal Height (cm): 31 cm        Urine Glucose Read-only: Negative  Urine Protein Read-only: Negative       Assessment and Plan    Problem List Items Addressed This Visit        Gynecologic and Obstetric Problems    Disorder of thyroid, antepartum    Overview     Followed by PCP  TSH prior to nob 12, recheck nob 11- synthroid increased         Relevant Medications    levothyroxine  (SYNTHROID, LEVOTHROID) 125 MCG tablet    Other Relevant Orders    US Ob Follow Up Transabdominal Approach       Other    Pregnancy    Overview     cfDNA low risk, AFP negative         Previous  section    Overview     Desires repeat         Relevant Orders    US Ob Follow Up Transabdominal Approach    History of pregnancy induced hypertension    Overview     Preeclampsia 34 wks, with twins  Baseline PEP, 24h urine  Baby asa 12 wks         Relevant Orders    US Ob Follow Up Transabdominal Approach    Rh negative, antepartum    Relevant Orders    US Ob Follow Up Transabdominal Approach   Other Visit Diagnoses     Prenatal care in third trimester    -  Primary    Relevant Orders    POC Glucose, Urine, Qualitative, Dipstick (Completed)    POC Protein, Urine, Qualitative, Dipstick (Completed)          1. Pregnancy at 32w5d  2. Fetal status reassuring.  3. 28 week labs reviewed.    4. Activity and Exercise discussed.  5. Fetal movement/PTL or Labor precautions  6. U/S ordered at follow up  Return in about 2 weeks (around 2023) for F/U Prenatal, U/S Next Visit.    Mile Reeves MD  2023

## 2023-04-27 ENCOUNTER — ANESTHESIA EVENT (OUTPATIENT)
Dept: LABOR AND DELIVERY | Facility: HOSPITAL | Age: 33
End: 2023-04-27
Payer: COMMERCIAL

## 2023-04-27 ENCOUNTER — ANESTHESIA (OUTPATIENT)
Dept: LABOR AND DELIVERY | Facility: HOSPITAL | Age: 33
End: 2023-04-27
Payer: COMMERCIAL

## 2023-04-27 ENCOUNTER — HOSPITAL ENCOUNTER (INPATIENT)
Facility: HOSPITAL | Age: 33
LOS: 3 days | Discharge: HOME | End: 2023-04-30
Attending: OBSTETRICS & GYNECOLOGY | Admitting: OBSTETRICS & GYNECOLOGY
Payer: COMMERCIAL

## 2023-04-27 DIAGNOSIS — Z98.891 STATUS POST CESAREAN SECTION: Primary | ICD-10-CM

## 2023-04-27 DIAGNOSIS — Z98.891 PREVIOUS CESAREAN SECTION: ICD-10-CM

## 2023-04-27 DIAGNOSIS — O36.4XX0 IUFD AT 20 WEEKS OR MORE OF GESTATION: ICD-10-CM

## 2023-04-27 LAB
ABO GROUP BLD: NORMAL
ALP SERPL-CCNC: 197 U/L (ref 39–117)
ALT SERPL W P-5'-P-CCNC: 39 U/L (ref 1–33)
AST SERPL-CCNC: 76 U/L (ref 1–32)
BILIRUB SERPL-MCNC: 0.2 MG/DL (ref 0–1.2)
BLD GP AB SCN SERPL QL: POSITIVE
CREAT SERPL-MCNC: 0.73 MG/DL (ref 0.57–1)
DEPRECATED RDW RBC AUTO: 45.5 FL (ref 37–54)
ERYTHROCYTE [DISTWIDTH] IN BLOOD BY AUTOMATED COUNT: 14.5 % (ref 12.3–15.4)
EXPIRATION DATE: ABNORMAL
FIBRINOGEN PPP-MCNC: 421 MG/DL (ref 203–470)
HCT VFR BLD AUTO: 40.1 % (ref 34–46.6)
HGB BLD-MCNC: 13.1 G/DL (ref 12–15.9)
LDH SERPL-CCNC: 421 U/L (ref 135–214)
Lab: ABNORMAL
MCH RBC QN AUTO: 28.8 PG (ref 26.6–33)
MCHC RBC AUTO-ENTMCNC: 32.7 G/DL (ref 31.5–35.7)
MCV RBC AUTO: 88.1 FL (ref 79–97)
PLATELET # BLD AUTO: 192 10*3/MM3 (ref 140–450)
PMV BLD AUTO: 11.7 FL (ref 6–12)
PROT UR STRIP-MCNC: ABNORMAL MG/DL
RBC # BLD AUTO: 4.55 10*6/MM3 (ref 3.77–5.28)
RESIDUAL RHIG DETECTED: NORMAL
RH BLD: NEGATIVE
T&S EXPIRATION DATE: NORMAL
URATE SERPL-MCNC: 5.3 MG/DL (ref 2.4–5.7)
WBC NRBC COR # BLD: 11.5 10*3/MM3 (ref 3.4–10.8)

## 2023-04-27 PROCEDURE — 81002 URINALYSIS NONAUTO W/O SCOPE: CPT | Performed by: OBSTETRICS & GYNECOLOGY

## 2023-04-27 PROCEDURE — 25010000002 OXYTOCIN PER 10 UNITS: Performed by: ANESTHESIOLOGY

## 2023-04-27 PROCEDURE — 25010000002 ONDANSETRON PER 1 MG: Performed by: ANESTHESIOLOGY

## 2023-04-27 PROCEDURE — 84450 TRANSFERASE (AST) (SGOT): CPT | Performed by: OBSTETRICS & GYNECOLOGY

## 2023-04-27 PROCEDURE — 25010000002 HYDROMORPHONE HCL-NACL 30-0.9 MG/30ML-% SOLUTION PREFILLED SYRINGE: Performed by: ANESTHESIOLOGY

## 2023-04-27 PROCEDURE — 25010000002 CEFAZOLIN IN DEXTROSE 2-4 GM/100ML-% SOLUTION: Performed by: OBSTETRICS & GYNECOLOGY

## 2023-04-27 PROCEDURE — 59514 CESAREAN DELIVERY ONLY: CPT | Performed by: OBSTETRICS & GYNECOLOGY

## 2023-04-27 PROCEDURE — 25010000002 MIDAZOLAM PER 1 MG: Performed by: ANESTHESIOLOGY

## 2023-04-27 PROCEDURE — 86900 BLOOD TYPING SEROLOGIC ABO: CPT | Performed by: OBSTETRICS & GYNECOLOGY

## 2023-04-27 PROCEDURE — 82247 BILIRUBIN TOTAL: CPT | Performed by: OBSTETRICS & GYNECOLOGY

## 2023-04-27 PROCEDURE — 25010000002 AZITHROMYCIN PER 500 MG: Performed by: OBSTETRICS & GYNECOLOGY

## 2023-04-27 PROCEDURE — 83615 LACTATE (LD) (LDH) ENZYME: CPT | Performed by: OBSTETRICS & GYNECOLOGY

## 2023-04-27 PROCEDURE — 85384 FIBRINOGEN ACTIVITY: CPT | Performed by: OBSTETRICS & GYNECOLOGY

## 2023-04-27 PROCEDURE — 25010000002 FENTANYL CITRATE (PF) 50 MCG/ML SOLUTION: Performed by: ANESTHESIOLOGY

## 2023-04-27 PROCEDURE — 86901 BLOOD TYPING SEROLOGIC RH(D): CPT | Performed by: OBSTETRICS & GYNECOLOGY

## 2023-04-27 PROCEDURE — 85027 COMPLETE CBC AUTOMATED: CPT | Performed by: OBSTETRICS & GYNECOLOGY

## 2023-04-27 PROCEDURE — S0260 H&P FOR SURGERY: HCPCS | Performed by: OBSTETRICS & GYNECOLOGY

## 2023-04-27 PROCEDURE — 84550 ASSAY OF BLOOD/URIC ACID: CPT | Performed by: OBSTETRICS & GYNECOLOGY

## 2023-04-27 PROCEDURE — 84460 ALANINE AMINO (ALT) (SGPT): CPT | Performed by: OBSTETRICS & GYNECOLOGY

## 2023-04-27 PROCEDURE — 25010000002 METOCLOPRAMIDE PER 10 MG: Performed by: ANESTHESIOLOGY

## 2023-04-27 PROCEDURE — 82565 ASSAY OF CREATININE: CPT | Performed by: OBSTETRICS & GYNECOLOGY

## 2023-04-27 PROCEDURE — 59510 CESAREAN DELIVERY: CPT | Performed by: OBSTETRICS & GYNECOLOGY

## 2023-04-27 PROCEDURE — 88307 TISSUE EXAM BY PATHOLOGIST: CPT | Performed by: OBSTETRICS & GYNECOLOGY

## 2023-04-27 PROCEDURE — 86870 RBC ANTIBODY IDENTIFICATION: CPT | Performed by: OBSTETRICS & GYNECOLOGY

## 2023-04-27 PROCEDURE — 84075 ASSAY ALKALINE PHOSPHATASE: CPT | Performed by: OBSTETRICS & GYNECOLOGY

## 2023-04-27 PROCEDURE — 86850 RBC ANTIBODY SCREEN: CPT | Performed by: OBSTETRICS & GYNECOLOGY

## 2023-04-27 RX ORDER — SODIUM CHLORIDE, SODIUM LACTATE, POTASSIUM CHLORIDE, CALCIUM CHLORIDE 600; 310; 30; 20 MG/100ML; MG/100ML; MG/100ML; MG/100ML
125 INJECTION, SOLUTION INTRAVENOUS CONTINUOUS
Status: DISCONTINUED | OUTPATIENT
Start: 2023-04-27 | End: 2023-04-29

## 2023-04-27 RX ORDER — KETOROLAC TROMETHAMINE 15 MG/ML
15 INJECTION, SOLUTION INTRAMUSCULAR; INTRAVENOUS EVERY 6 HOURS
Status: COMPLETED | OUTPATIENT
Start: 2023-04-28 | End: 2023-04-28

## 2023-04-27 RX ORDER — NALOXONE HCL 0.4 MG/ML
0.1 VIAL (ML) INJECTION
Status: DISCONTINUED | OUTPATIENT
Start: 2023-04-27 | End: 2023-04-30 | Stop reason: HOSPADM

## 2023-04-27 RX ORDER — ACETAMINOPHEN 325 MG/1
650 TABLET ORAL EVERY 6 HOURS
Status: DISCONTINUED | OUTPATIENT
Start: 2023-04-29 | End: 2023-04-30 | Stop reason: HOSPADM

## 2023-04-27 RX ORDER — CEFAZOLIN SODIUM 2 G/100ML
2 INJECTION, SOLUTION INTRAVENOUS ONCE
Status: COMPLETED | OUTPATIENT
Start: 2023-04-27 | End: 2023-04-27

## 2023-04-27 RX ORDER — ONDANSETRON 2 MG/ML
INJECTION INTRAMUSCULAR; INTRAVENOUS AS NEEDED
Status: DISCONTINUED | OUTPATIENT
Start: 2023-04-27 | End: 2023-04-27 | Stop reason: SURG

## 2023-04-27 RX ORDER — ALUMINA, MAGNESIA, AND SIMETHICONE 2400; 2400; 240 MG/30ML; MG/30ML; MG/30ML
15 SUSPENSION ORAL EVERY 4 HOURS PRN
Status: DISCONTINUED | OUTPATIENT
Start: 2023-04-27 | End: 2023-04-30 | Stop reason: HOSPADM

## 2023-04-27 RX ORDER — METOCLOPRAMIDE 10 MG/1
10 TABLET ORAL EVERY 8 HOURS PRN
Status: DISCONTINUED | OUTPATIENT
Start: 2023-04-27 | End: 2023-04-30 | Stop reason: HOSPADM

## 2023-04-27 RX ORDER — SODIUM CHLORIDE 0.9 % (FLUSH) 0.9 %
1-10 SYRINGE (ML) INJECTION AS NEEDED
Status: DISCONTINUED | OUTPATIENT
Start: 2023-04-27 | End: 2023-04-30 | Stop reason: HOSPADM

## 2023-04-27 RX ORDER — CARBOPROST TROMETHAMINE 250 UG/ML
250 INJECTION, SOLUTION INTRAMUSCULAR AS NEEDED
Status: DISCONTINUED | OUTPATIENT
Start: 2023-04-27 | End: 2023-04-30 | Stop reason: HOSPADM

## 2023-04-27 RX ORDER — SODIUM CHLORIDE 0.9 % (FLUSH) 0.9 %
10 SYRINGE (ML) INJECTION EVERY 12 HOURS SCHEDULED
Status: DISCONTINUED | OUTPATIENT
Start: 2023-04-27 | End: 2023-04-30 | Stop reason: HOSPADM

## 2023-04-27 RX ORDER — MAGNESIUM SULFATE HEPTAHYDRATE 40 MG/ML
2 INJECTION, SOLUTION INTRAVENOUS CONTINUOUS
Status: DISCONTINUED | OUTPATIENT
Start: 2023-04-28 | End: 2023-04-29

## 2023-04-27 RX ORDER — ACETAMINOPHEN 500 MG
1000 TABLET ORAL ONCE
Status: COMPLETED | OUTPATIENT
Start: 2023-04-27 | End: 2023-04-27

## 2023-04-27 RX ORDER — METHYLERGONOVINE MALEATE 0.2 MG/ML
200 INJECTION INTRAVENOUS ONCE AS NEEDED
Status: DISCONTINUED | OUTPATIENT
Start: 2023-04-27 | End: 2023-04-30 | Stop reason: HOSPADM

## 2023-04-27 RX ORDER — MISOPROSTOL 200 UG/1
800 TABLET ORAL AS NEEDED
Status: DISCONTINUED | OUTPATIENT
Start: 2023-04-27 | End: 2023-04-30 | Stop reason: HOSPADM

## 2023-04-27 RX ORDER — SIMETHICONE 80 MG
80 TABLET,CHEWABLE ORAL 4 TIMES DAILY PRN
Status: DISCONTINUED | OUTPATIENT
Start: 2023-04-27 | End: 2023-04-30 | Stop reason: HOSPADM

## 2023-04-27 RX ORDER — CALCIUM CARBONATE 200(500)MG
1 TABLET,CHEWABLE ORAL EVERY 4 HOURS PRN
Status: DISCONTINUED | OUTPATIENT
Start: 2023-04-27 | End: 2023-04-30 | Stop reason: HOSPADM

## 2023-04-27 RX ORDER — OXYCODONE HYDROCHLORIDE 5 MG/1
10 TABLET ORAL EVERY 4 HOURS PRN
Status: DISCONTINUED | OUTPATIENT
Start: 2023-04-27 | End: 2023-04-27 | Stop reason: ALTCHOICE

## 2023-04-27 RX ORDER — FENTANYL CITRATE 50 UG/ML
INJECTION, SOLUTION INTRAMUSCULAR; INTRAVENOUS AS NEEDED
Status: DISCONTINUED | OUTPATIENT
Start: 2023-04-27 | End: 2023-04-27 | Stop reason: SURG

## 2023-04-27 RX ORDER — PRENATAL VIT/IRON FUM/FOLIC AC 27MG-0.8MG
1 TABLET ORAL DAILY
Status: DISCONTINUED | OUTPATIENT
Start: 2023-04-28 | End: 2023-04-30 | Stop reason: HOSPADM

## 2023-04-27 RX ORDER — DOCUSATE SODIUM 100 MG/1
100 CAPSULE, LIQUID FILLED ORAL 2 TIMES DAILY PRN
Status: DISCONTINUED | OUTPATIENT
Start: 2023-04-27 | End: 2023-04-30 | Stop reason: HOSPADM

## 2023-04-27 RX ORDER — METOCLOPRAMIDE HYDROCHLORIDE 5 MG/ML
INJECTION INTRAMUSCULAR; INTRAVENOUS AS NEEDED
Status: DISCONTINUED | OUTPATIENT
Start: 2023-04-27 | End: 2023-04-27 | Stop reason: SURG

## 2023-04-27 RX ORDER — IBUPROFEN 600 MG/1
600 TABLET ORAL EVERY 6 HOURS
Status: DISCONTINUED | OUTPATIENT
Start: 2023-04-29 | End: 2023-04-30 | Stop reason: HOSPADM

## 2023-04-27 RX ORDER — OXYTOCIN/0.9 % SODIUM CHLORIDE 30/500 ML
999 PLASTIC BAG, INJECTION (ML) INTRAVENOUS ONCE
Status: DISCONTINUED | OUTPATIENT
Start: 2023-04-27 | End: 2023-04-30 | Stop reason: HOSPADM

## 2023-04-27 RX ORDER — ACETAMINOPHEN 500 MG
1000 TABLET ORAL EVERY 6 HOURS
Status: COMPLETED | OUTPATIENT
Start: 2023-04-28 | End: 2023-04-28

## 2023-04-27 RX ORDER — LIDOCAINE HYDROCHLORIDE 10 MG/ML
5 INJECTION, SOLUTION EPIDURAL; INFILTRATION; INTRACAUDAL; PERINEURAL AS NEEDED
Status: DISCONTINUED | OUTPATIENT
Start: 2023-04-27 | End: 2023-04-30 | Stop reason: HOSPADM

## 2023-04-27 RX ORDER — TRISODIUM CITRATE DIHYDRATE AND CITRIC ACID MONOHYDRATE 500; 334 MG/5ML; MG/5ML
30 SOLUTION ORAL ONCE
Status: COMPLETED | OUTPATIENT
Start: 2023-04-27 | End: 2023-04-27

## 2023-04-27 RX ORDER — OXYTOCIN/0.9 % SODIUM CHLORIDE 30/500 ML
PLASTIC BAG, INJECTION (ML) INTRAVENOUS AS NEEDED
Status: DISCONTINUED | OUTPATIENT
Start: 2023-04-27 | End: 2023-04-27 | Stop reason: SURG

## 2023-04-27 RX ORDER — SODIUM CHLORIDE 0.9 % (FLUSH) 0.9 %
10 SYRINGE (ML) INJECTION AS NEEDED
Status: DISCONTINUED | OUTPATIENT
Start: 2023-04-27 | End: 2023-04-30 | Stop reason: HOSPADM

## 2023-04-27 RX ORDER — MIDAZOLAM HYDROCHLORIDE 1 MG/ML
INJECTION INTRAMUSCULAR; INTRAVENOUS AS NEEDED
Status: DISCONTINUED | OUTPATIENT
Start: 2023-04-27 | End: 2023-04-27 | Stop reason: SURG

## 2023-04-27 RX ORDER — METOCLOPRAMIDE 10 MG/1
10 TABLET ORAL AS NEEDED
Status: DISCONTINUED | OUTPATIENT
Start: 2023-04-27 | End: 2023-04-27

## 2023-04-27 RX ORDER — OXYTOCIN/0.9 % SODIUM CHLORIDE 30/500 ML
250 PLASTIC BAG, INJECTION (ML) INTRAVENOUS CONTINUOUS
Status: ACTIVE | OUTPATIENT
Start: 2023-04-28 | End: 2023-04-28

## 2023-04-27 RX ORDER — BUPIVACAINE HYDROCHLORIDE 7.5 MG/ML
INJECTION, SOLUTION INTRASPINAL AS NEEDED
Status: DISCONTINUED | OUTPATIENT
Start: 2023-04-27 | End: 2023-04-27 | Stop reason: SURG

## 2023-04-27 RX ORDER — HYDROCORTISONE 25 MG/G
1 CREAM TOPICAL AS NEEDED
Status: DISCONTINUED | OUTPATIENT
Start: 2023-04-27 | End: 2023-04-30 | Stop reason: HOSPADM

## 2023-04-27 RX ORDER — FAMOTIDINE 10 MG/ML
INJECTION, SOLUTION INTRAVENOUS AS NEEDED
Status: DISCONTINUED | OUTPATIENT
Start: 2023-04-27 | End: 2023-04-27 | Stop reason: SURG

## 2023-04-27 RX ORDER — OXYTOCIN 10 [USP'U]/ML
INJECTION, SOLUTION INTRAMUSCULAR; INTRAVENOUS AS NEEDED
Status: DISCONTINUED | OUTPATIENT
Start: 2023-04-27 | End: 2023-04-27 | Stop reason: SURG

## 2023-04-27 RX ORDER — OXYCODONE HYDROCHLORIDE 5 MG/1
5 TABLET ORAL EVERY 4 HOURS PRN
Status: DISCONTINUED | OUTPATIENT
Start: 2023-04-27 | End: 2023-04-27 | Stop reason: ALTCHOICE

## 2023-04-27 RX ORDER — MISOPROSTOL 200 UG/1
600 TABLET ORAL AS NEEDED
Status: DISCONTINUED | OUTPATIENT
Start: 2023-04-27 | End: 2023-04-30 | Stop reason: HOSPADM

## 2023-04-27 RX ADMIN — OXYTOCIN 3 UNITS: 10 INJECTION, SOLUTION INTRAMUSCULAR; INTRAVENOUS at 22:11

## 2023-04-27 RX ADMIN — MIDAZOLAM 2 MG: 1 INJECTION INTRAMUSCULAR; INTRAVENOUS at 21:48

## 2023-04-27 RX ADMIN — MIDAZOLAM 1 MG: 1 INJECTION INTRAMUSCULAR; INTRAVENOUS at 22:01

## 2023-04-27 RX ADMIN — MIDAZOLAM 1 MG: 1 INJECTION INTRAMUSCULAR; INTRAVENOUS at 22:23

## 2023-04-27 RX ADMIN — ACETAMINOPHEN 1000 MG: 500 TABLET ORAL at 21:02

## 2023-04-27 RX ADMIN — AZITHROMYCIN 500 MG: 500 INJECTION, POWDER, LYOPHILIZED, FOR SOLUTION INTRAVENOUS at 21:42

## 2023-04-27 RX ADMIN — BUPIVACAINE HYDROCHLORIDE IN DEXTROSE 1.6 MG: 7.5 INJECTION, SOLUTION SUBARACHNOID at 21:53

## 2023-04-27 RX ADMIN — Medication 500 ML: at 22:33

## 2023-04-27 RX ADMIN — METOCLOPRAMIDE 10 MG: 5 INJECTION, SOLUTION INTRAMUSCULAR; INTRAVENOUS at 21:48

## 2023-04-27 RX ADMIN — SODIUM CITRATE AND CITRIC ACID MONOHYDRATE 30 ML: 500; 334 SOLUTION ORAL at 21:42

## 2023-04-27 RX ADMIN — ONDANSETRON 4 MG: 2 INJECTION INTRAMUSCULAR; INTRAVENOUS at 21:48

## 2023-04-27 RX ADMIN — SODIUM CHLORIDE, POTASSIUM CHLORIDE, SODIUM LACTATE AND CALCIUM CHLORIDE 999 ML/HR: 600; 310; 30; 20 INJECTION, SOLUTION INTRAVENOUS at 21:22

## 2023-04-27 RX ADMIN — MIDAZOLAM 1 MG: 1 INJECTION INTRAMUSCULAR; INTRAVENOUS at 22:17

## 2023-04-27 RX ADMIN — Medication 500 ML: at 22:11

## 2023-04-27 RX ADMIN — CEFAZOLIN SODIUM 2 G: 2 INJECTION, SOLUTION INTRAVENOUS at 20:52

## 2023-04-27 RX ADMIN — SODIUM CHLORIDE, POTASSIUM CHLORIDE, SODIUM LACTATE AND CALCIUM CHLORIDE 1000 ML: 600; 310; 30; 20 INJECTION, SOLUTION INTRAVENOUS at 20:45

## 2023-04-27 RX ADMIN — FAMOTIDINE 20 MG: 10 INJECTION, SOLUTION INTRAVENOUS at 21:48

## 2023-04-27 RX ADMIN — SODIUM CHLORIDE, POTASSIUM CHLORIDE, SODIUM LACTATE AND CALCIUM CHLORIDE: 600; 310; 30; 20 INJECTION, SOLUTION INTRAVENOUS at 21:48

## 2023-04-27 RX ADMIN — Medication: at 23:33

## 2023-04-27 RX ADMIN — FENTANYL CITRATE 25 MCG: 50 INJECTION, SOLUTION INTRAMUSCULAR; INTRAVENOUS at 21:53

## 2023-04-27 RX ADMIN — OXYTOCIN 3 UNITS: 10 INJECTION, SOLUTION INTRAMUSCULAR; INTRAVENOUS at 22:14

## 2023-04-27 NOTE — H&P
[facility]  Obstetric History and Physical    Referring Provider: Mile Reeves MD      CC: Suspect intrauterine fetal demise    Subjective     Patient is a 33 y.o. female  currently at 34w1d, who presents with complaint of possible fetal loss.  Patient complained of no fetal movement today.  Patient had scheduled fetal pictures,    sonographer reports decreased amniotic fluid and no cardiac activity.  Patient presented for confirmation.  Patient complains of menstrual type cramping but denies any vaginal bleeding, recent trauma, fever, shortness of breath, or chest pain..   course  uncomplicated to date.  Obstetrical history significant for previous  section at 34 weeks twin gestation due to preeclampsia.     .    The following portions of the patients history were reviewed and updated as appropriate: current medications, allergies, past medical history, past surgical history, past family history, past social history and problem list .       Prenatal Information:   Maternal Prenatal Labs  Blood Type No results found for: ABO   Rh Status No results found for: RH   Antibody Screen No results found for: ABSCRN   Gonnorhea No results found for: GCCX   Chlamydia No results found for: CLAMYDCU   RPR No results found for: RPR   Syphilis Antibody No results found for: SYPHILIS   Rubella No results found for: RUBELLAIGGIN   Hepatitis B Surface Antigen No results found for: HEPBSAG   HIV-1 Antibody No results found for: LABHIV1   Hepatitis C Antibody No results found for: HEPCAB   Rapid Urin Drug Screen No results found for: AMPMETHU, BARBITSCNUR, LABBENZSCN, LABMETHSCN, LABOPIASCN, THCURSCR, COCAINEUR, AMPHETSCREEN, PROPOXSCN, BUPRENORSCNU, METAMPSCNUR, OXYCODONESCN, TRICYCLICSCN   Group B Strep Culture No results found for: GBSANTIGEN           External Prenatal Results     Pregnancy Outside Results - Transcribed From Office Records - See Scanned Records For Details     Test Value Date Time    ABO   O  22 1114    Rh  Negative  22 1114    Antibody Screen  Negative  23 1111       Negative  22 1114    Varicella IgG       Rubella  4.27 index 22 1114    Hgb  11.3 g/dL 23 1111       12.1 g/dL 22 1114    Hct  33.5 % 23 1111       35.9 % 22 1114    Glucose Fasting GTT       Glucose Tolerance Test 1 hour       Glucose Tolerance Test 3 hour       Gonorrhea (discrete)       Chlamydia (discrete)       RPR  Non Reactive  22 1114    VDRL       Syphilis Antibody       HBsAg  Negative  22 1114    Herpes Simplex Virus PCR       Herpes Simplex VIrus Culture       HIV  Non Reactive  22 1114    Hep C RNA Quant PCR       Hep C Antibody  <0.1 s/co ratio 22 1114    AFP  19.7 ng/mL 12/15/22 1224    Group B Strep       GBS Susceptibility to Clindamycin       GBS Susceptibility to Erythromycin       Fetal Fibronectin       Genetic Testing, Maternal Blood             Drug Screening     Test Value Date Time    Urine Drug Screen       Amphetamine Screen  Negative ng/mL 22 1114    Barbiturate Screen  Negative ng/mL 22 1114    Benzodiazepine Screen  Negative ng/mL 22 1114    Methadone Screen  Negative ng/mL 22 1114    Phencyclidine Screen  Negative ng/mL 22 1114    Opiates Screen       THC Screen       Cocaine Screen       Propoxyphene Screen  Negative ng/mL 22 1114    Buprenorphine Screen       Methamphetamine Screen       Oxycodone Screen       Tricyclic Antidepressants Screen             Legend    ^: Historical                          Past OB History:       OB History    Para Term  AB Living   2 1 0 1 0 2   SAB IAB Ectopic Molar Multiple Live Births   0 0 0 0 1 2      # Outcome Date GA Lbr Hay/2nd Weight Sex Delivery Anes PTL Lv   2 Current            1A  10/20/18 34w2d  1960 g (4 lb 5.1 oz) M CS-LTranv Spinal N MARTHA      Name: FRANSICO SPAULDING      Apgar1: 7  Apgar5: 8   1B  10/20/18 34w2d   1770 g (3 lb 14.4 oz) F CS-LTranv Spinal N MARTHA      Name: ELENA SPAULDING      Apgar1: 6  Apgar5: 8      Obstetric Comments   Pregnancy #1 - IVF conception; fresh cycle; no donors.        Past Medical History: Past Medical History:   Diagnosis Date   • Female infertility unexplained after evaluation    • History of anxiety     stopped meds    • Hypothyroidism 2017      Past Surgical History Past Surgical History:   Procedure Laterality Date   •  SECTION N/A 10/20/2018    Procedure:  SECTION PRIMARY;  Surgeon: Bari Hughes DO;  Location: [facility] LABOR DELIVERY;  Service: Obstetrics/Gynecology   • ENDOSCOPY AND COLONOSCOPY     • FERTILITY SURGERY  2018    Oocyte Retrieval   • TONSILLECTOMY AND ADENOIDECTOMY     • WISDOM TOOTH EXTRACTION        Family History: Family History   Problem Relation Age of Onset   • Uterine cancer Neg Hx    • Breast cancer Neg Hx    • Colon cancer Neg Hx    • Ovarian cancer Neg Hx       Social History:  reports that she has never smoked. She has never used smokeless tobacco.   reports no history of alcohol use.   reports no history of drug use.                   General ROS Negative Findings:Headaches, Visual Changes, Epigastric pain, Anorexia, Nausia/Vomiting, ROM and Vaginal Bleeding    ROS     All other systems have been reviewed and are neg  Objective       Vital Signs Range for the last 24 hours  Temperature:     Temp Source:     BP:     Pulse:     Respirations:     SPO2:     O2 Amount (l/min):     O2 Devices     Weight:       Physical Examination:   General:   alert, appears stated age and cooperative   Skin:   normal   HEENT:  Sclera clear   Lungs:   clear to auscultation bilaterally   Heart:   regular rate and rhythm, S1, S2 normal, no murmur, click, rub or gallop   Gastrointestinal:  Abdomen soft, gravid uterus nontender, guarding benign exam   Lower Extremities:  1+ edema   : External genitalia: normal general appearance  Uterus:  enlarged   Musculoskeletal:     Neuro:  No focal deficit no DTR 2+4 no clonus         Presentation:    Cervix: Exam by: Method: sterile exam per physician   Dilation:  Closed   Effacement:  th   Station:  NE       Fetal Heart Rate Assessment   Method:     Beats/min:     Baseline:     Varibility:     Accels:     Decels:     Tracing Category:       Uterine Assessment   Method:     Frequency (min):     Ctx Count in 10 min:     Duration:     Intensity:     Intensity by IUPC:     Resting Tone:     Resting Tone by IUPC:     Sartell Units:       Laboratory Results:   Lab Results (last 24 hours)     ** No results found for the last 24 hours. **        Radiology Review:   Imaging Results (Last 24 Hours)     ** No results found for the last 24 hours. **        Other Studies: Bedside ultrasound.  Single IUP transverse lie, head maternal right, anterior placenta, no cardiac motion identified    Assessment & Plan       IUFD at 20 weeks or more of gestation        Assessment:  1.  Intrauterine pregnancy at 34w1d weeks gestation     2.  IUFD  3.  Transverse lie presentation  4.  Prior  x1  5.  Maternal blood type O-  6.  Hypothyroidism  7.  Obesity    Plan:  1.  Admit, labs, n.p.o., IV access, once all data available proceed with repeat .  2. Plan of care has been reviewed with patient.  3.  Risks, benefits of treatment plan have been discussed.  4.  All questions have been answered.  5   discussed with Dr. Moreno and Dr. Sharmila Hughes,   2023  19:50 EDT

## 2023-04-28 LAB
ALP SERPL-CCNC: 161 U/L (ref 39–117)
ALP SERPL-CCNC: 169 U/L (ref 39–117)
ALT SERPL W P-5'-P-CCNC: 59 U/L (ref 1–33)
ALT SERPL W P-5'-P-CCNC: 60 U/L (ref 1–33)
AST SERPL-CCNC: 128 U/L (ref 1–32)
AST SERPL-CCNC: 99 U/L (ref 1–32)
BASOPHILS # BLD AUTO: 0.01 10*3/MM3 (ref 0–0.2)
BASOPHILS NFR BLD AUTO: 0.1 % (ref 0–1.5)
BILIRUB SERPL-MCNC: 0.6 MG/DL (ref 0–1.2)
BILIRUB SERPL-MCNC: 0.9 MG/DL (ref 0–1.2)
CREAT SERPL-MCNC: 0.59 MG/DL (ref 0.57–1)
CREAT SERPL-MCNC: 0.61 MG/DL (ref 0.57–1)
DEPRECATED RDW RBC AUTO: 45.7 FL (ref 37–54)
DEPRECATED RDW RBC AUTO: 45.8 FL (ref 37–54)
EOSINOPHIL # BLD AUTO: 0.01 10*3/MM3 (ref 0–0.4)
EOSINOPHIL NFR BLD AUTO: 0.1 % (ref 0.3–6.2)
ERYTHROCYTE [DISTWIDTH] IN BLOOD BY AUTOMATED COUNT: 14.6 % (ref 12.3–15.4)
ERYTHROCYTE [DISTWIDTH] IN BLOOD BY AUTOMATED COUNT: 14.6 % (ref 12.3–15.4)
HCT VFR BLD AUTO: 30.4 % (ref 34–46.6)
HCT VFR BLD AUTO: 33.2 % (ref 34–46.6)
HGB BLD-MCNC: 10.1 G/DL (ref 12–15.9)
HGB BLD-MCNC: 10.9 G/DL (ref 12–15.9)
IMM GRANULOCYTES # BLD AUTO: 0.09 10*3/MM3 (ref 0–0.05)
IMM GRANULOCYTES NFR BLD AUTO: 0.9 % (ref 0–0.5)
LDH SERPL-CCNC: 754 U/L (ref 135–214)
LDH SERPL-CCNC: 822 U/L (ref 135–214)
LYMPHOCYTES # BLD AUTO: 1.13 10*3/MM3 (ref 0.7–3.1)
LYMPHOCYTES NFR BLD AUTO: 11.2 % (ref 19.6–45.3)
MCH RBC QN AUTO: 28.8 PG (ref 26.6–33)
MCH RBC QN AUTO: 28.9 PG (ref 26.6–33)
MCHC RBC AUTO-ENTMCNC: 32.8 G/DL (ref 31.5–35.7)
MCHC RBC AUTO-ENTMCNC: 33.2 G/DL (ref 31.5–35.7)
MCV RBC AUTO: 86.9 FL (ref 79–97)
MCV RBC AUTO: 87.8 FL (ref 79–97)
MONOCYTES # BLD AUTO: 0.39 10*3/MM3 (ref 0.1–0.9)
MONOCYTES NFR BLD AUTO: 3.9 % (ref 5–12)
NEUTROPHILS NFR BLD AUTO: 8.45 10*3/MM3 (ref 1.7–7)
NEUTROPHILS NFR BLD AUTO: 83.8 % (ref 42.7–76)
NRBC BLD AUTO-RTO: 0 /100 WBC (ref 0–0.2)
PLATELET # BLD AUTO: 102 10*3/MM3 (ref 140–450)
PLATELET # BLD AUTO: 94 10*3/MM3 (ref 140–450)
PMV BLD AUTO: 10.9 FL (ref 6–12)
PMV BLD AUTO: 11.4 FL (ref 6–12)
RBC # BLD AUTO: 3.5 10*6/MM3 (ref 3.77–5.28)
RBC # BLD AUTO: 3.78 10*6/MM3 (ref 3.77–5.28)
URATE SERPL-MCNC: 4.1 MG/DL (ref 2.4–5.7)
URATE SERPL-MCNC: 4.5 MG/DL (ref 2.4–5.7)
WBC NRBC COR # BLD: 10.08 10*3/MM3 (ref 3.4–10.8)
WBC NRBC COR # BLD: 10.2 10*3/MM3 (ref 3.4–10.8)

## 2023-04-28 PROCEDURE — 0503F POSTPARTUM CARE VISIT: CPT | Performed by: OBSTETRICS & GYNECOLOGY

## 2023-04-28 PROCEDURE — 84460 ALANINE AMINO (ALT) (SGPT): CPT | Performed by: OBSTETRICS & GYNECOLOGY

## 2023-04-28 PROCEDURE — 84550 ASSAY OF BLOOD/URIC ACID: CPT | Performed by: OBSTETRICS & GYNECOLOGY

## 2023-04-28 PROCEDURE — 82565 ASSAY OF CREATININE: CPT | Performed by: OBSTETRICS & GYNECOLOGY

## 2023-04-28 PROCEDURE — 84075 ASSAY ALKALINE PHOSPHATASE: CPT | Performed by: OBSTETRICS & GYNECOLOGY

## 2023-04-28 PROCEDURE — 83615 LACTATE (LD) (LDH) ENZYME: CPT | Performed by: OBSTETRICS & GYNECOLOGY

## 2023-04-28 PROCEDURE — 0 MAGNESIUM SULFATE 20 GM/500ML SOLUTION: Performed by: OBSTETRICS & GYNECOLOGY

## 2023-04-28 PROCEDURE — 85027 COMPLETE CBC AUTOMATED: CPT | Performed by: OBSTETRICS & GYNECOLOGY

## 2023-04-28 PROCEDURE — 25010000002 KETOROLAC TROMETHAMINE PER 15 MG: Performed by: OBSTETRICS & GYNECOLOGY

## 2023-04-28 PROCEDURE — 25010000002 ONDANSETRON PER 1 MG: Performed by: OBSTETRICS & GYNECOLOGY

## 2023-04-28 PROCEDURE — 82247 BILIRUBIN TOTAL: CPT | Performed by: OBSTETRICS & GYNECOLOGY

## 2023-04-28 PROCEDURE — 84450 TRANSFERASE (AST) (SGOT): CPT | Performed by: OBSTETRICS & GYNECOLOGY

## 2023-04-28 PROCEDURE — 85025 COMPLETE CBC W/AUTO DIFF WBC: CPT | Performed by: OBSTETRICS & GYNECOLOGY

## 2023-04-28 RX ORDER — OXYCODONE HYDROCHLORIDE 5 MG/1
10 TABLET ORAL EVERY 4 HOURS PRN
Status: DISCONTINUED | OUTPATIENT
Start: 2023-04-28 | End: 2023-04-30 | Stop reason: HOSPADM

## 2023-04-28 RX ORDER — OXYCODONE HYDROCHLORIDE 5 MG/1
5 TABLET ORAL EVERY 4 HOURS PRN
Status: DISCONTINUED | OUTPATIENT
Start: 2023-04-28 | End: 2023-04-30 | Stop reason: HOSPADM

## 2023-04-28 RX ORDER — DEXTROSE AND SODIUM CHLORIDE 5; .2 G/100ML; G/100ML
75 INJECTION, SOLUTION INTRAVENOUS CONTINUOUS
Status: DISCONTINUED | OUTPATIENT
Start: 2023-04-28 | End: 2023-04-29

## 2023-04-28 RX ORDER — LORAZEPAM 1 MG/1
1 TABLET ORAL EVERY 12 HOURS PRN
Status: DISCONTINUED | OUTPATIENT
Start: 2023-04-28 | End: 2023-04-30 | Stop reason: HOSPADM

## 2023-04-28 RX ORDER — ONDANSETRON 2 MG/ML
4 INJECTION INTRAMUSCULAR; INTRAVENOUS EVERY 6 HOURS PRN
Status: DISCONTINUED | OUTPATIENT
Start: 2023-04-28 | End: 2023-04-30 | Stop reason: HOSPADM

## 2023-04-28 RX ADMIN — ACETAMINOPHEN 1000 MG: 500 TABLET ORAL at 02:42

## 2023-04-28 RX ADMIN — PRENATAL VITAMINS-IRON FUMARATE 27 MG IRON-FOLIC ACID 0.8 MG TABLET 1 TABLET: at 09:29

## 2023-04-28 RX ADMIN — LORAZEPAM 1 MG: 1 TABLET ORAL at 09:29

## 2023-04-28 RX ADMIN — ACETAMINOPHEN 1000 MG: 500 TABLET ORAL at 21:10

## 2023-04-28 RX ADMIN — KETOROLAC TROMETHAMINE 15 MG: 15 INJECTION, SOLUTION INTRAMUSCULAR; INTRAVENOUS at 18:36

## 2023-04-28 RX ADMIN — DOCUSATE SODIUM 100 MG: 100 CAPSULE, LIQUID FILLED ORAL at 09:29

## 2023-04-28 RX ADMIN — DEXTROSE AND SODIUM CHLORIDE 75 ML/HR: 5; 200 INJECTION, SOLUTION INTRAVENOUS at 02:00

## 2023-04-28 RX ADMIN — KETOROLAC TROMETHAMINE 15 MG: 15 INJECTION, SOLUTION INTRAMUSCULAR; INTRAVENOUS at 23:45

## 2023-04-28 RX ADMIN — ACETAMINOPHEN 1000 MG: 500 TABLET ORAL at 15:34

## 2023-04-28 RX ADMIN — ACETAMINOPHEN 1000 MG: 500 TABLET ORAL at 09:28

## 2023-04-28 RX ADMIN — KETOROLAC TROMETHAMINE 15 MG: 15 INJECTION, SOLUTION INTRAMUSCULAR; INTRAVENOUS at 12:27

## 2023-04-28 RX ADMIN — DEXTROSE AND SODIUM CHLORIDE 75 ML/HR: 5; 200 INJECTION, SOLUTION INTRAVENOUS at 15:34

## 2023-04-28 RX ADMIN — KETOROLAC TROMETHAMINE 15 MG: 15 INJECTION, SOLUTION INTRAMUSCULAR; INTRAVENOUS at 07:26

## 2023-04-28 RX ADMIN — LEVOTHYROXINE SODIUM 125 MCG: 25 TABLET ORAL at 06:33

## 2023-04-28 RX ADMIN — SERTRALINE 50 MG: 50 TABLET, FILM COATED ORAL at 15:34

## 2023-04-28 RX ADMIN — LORAZEPAM 1 MG: 1 TABLET ORAL at 23:45

## 2023-04-28 RX ADMIN — MAGNESIUM SULFATE IN WATER 2 G/HR: 40 INJECTION, SOLUTION INTRAVENOUS at 06:33

## 2023-04-28 RX ADMIN — METOCLOPRAMIDE 10 MG: 10 TABLET ORAL at 02:50

## 2023-04-28 RX ADMIN — ONDANSETRON 4 MG: 2 INJECTION INTRAMUSCULAR; INTRAVENOUS at 17:12

## 2023-04-28 RX ADMIN — SIMETHICONE 80 MG: 80 TABLET, CHEWABLE ORAL at 17:16

## 2023-04-28 RX ADMIN — MAGNESIUM SULFATE IN WATER 2 G/HR: 40 INJECTION, SOLUTION INTRAVENOUS at 20:46

## 2023-04-28 NOTE — ANESTHESIA PREPROCEDURE EVALUATION
Anesthesia Evaluation     NPO Solid Status: > 8 hours  NPO Liquid Status: > 8 hours           Airway   Mallampati: II  TM distance: >3 FB  Neck ROM: full  No difficulty expected  Dental      Pulmonary    Cardiovascular         Neuro/Psych  GI/Hepatic/Renal/Endo    (+)   thyroid problem hypothyroidism    Musculoskeletal     Abdominal    Substance History      OB/GYN          Other                        Anesthesia Plan    ASA 2     spinal and ITN       Anesthetic plan, risks, benefits, and alternatives have been provided, discussed and informed consent has been obtained with: patient and spouse/significant other.  Pre-procedure education provided      CODE STATUS:    Level Of Support Discussed With: Patient  Code Status (Patient has no pulse and is not breathing): CPR (Attempt to Resuscitate)  Medical Interventions (Patient has pulse or is breathing): Full Support  Release to patient: Routine Release

## 2023-04-28 NOTE — PROGRESS NOTES
Patient called with no fetal movement.  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  She arrives here and there are no fetal heart tones baby is transverse cervix closed so we are going to proceed with a repeat .  She desires a repeat .  All risks were explained and we will proceed.  At this time there is no obvious reason for fetal demise.

## 2023-04-28 NOTE — CONSULTS
" visited at length with patient and her . They are understandably heartbroken. The patient shared how she felt once she learned the baby was dead, the dread she feels at telling her 4 year old twins, and their plans for burial. Per patient and , they have supportive family nearby on both sides. Patient stated, \"This is God's will.\" Writer offered emotional and spiritual support.   "

## 2023-04-28 NOTE — ANESTHESIA PROCEDURE NOTES
Spinal Block      Patient reassessed immediately prior to procedure    Patient location during procedure: OB  Indication:procedure for pain  Performed By  Anesthesiologist: Karin Anderson DO  Preanesthetic Checklist  Completed: patient identified, IV checked, risks and benefits discussed, surgical consent, monitors and equipment checked, pre-op evaluation and timeout performed  Spinal Block Prep:  Patient Position:sitting  Sterile Tech:cap, gloves, mask and sterile barriers  Prep:Chloraprep  Patient Monitoring:blood pressure monitoring and EKG    Spinal Block Procedure  Approach:midline  Guidance:palpation technique  Location:L3-L4  Needle Type:Pencan  Needle Gauge:25 G  Placement of Spinal needle event:cerebrospinal fluid aspirated  Paresthesia: no  Fluid Appearance:clear     Post Assessment  Patient Tolerance:patient tolerated the procedure well with no apparent complications  Complications no

## 2023-04-28 NOTE — PROGRESS NOTES
2023    Name:Yamilex Carty    MR#:[id number]     PROGRESS NOTE:  Post-Op 1 S/P    HD:1    Subjective   33 y.o. yo Female  s/p CS at 34w1d with IUFD, preeclampsia. Pain well controlled. Tolerating regular diet and having flatus. Lochia normal.   On PP magnesium, her BPs have been mild range, LFTs increased from last night and plt down to 102 from 192 last night.       Patient Active Problem List   Diagnosis   • Pregnancy   • Previous  section   • History of pregnancy induced hypertension   • Disorder of thyroid, antepartum   • Rh negative, antepartum   • IUFD at 20 weeks or more of gestation        Objective    Vitals  Temp:  Temp:  [97.8 °F (36.6 °C)-98.9 °F (37.2 °C)] 97.9 °F (36.6 °C)  Temp src: Oral  BP:  BP: (109-165)/() 145/87  Pulse:  Heart Rate:  [] 106  RR:   Resp:  [14-18] 18    General Awake, alert, no distress  Abdomen Soft, non-distended, fundus firm, below umbilicus, appropriately tender  Incision  Intact, no erythema or exudate  Extremities Calves NT bilaterally     I/O last 3 completed shifts:  In: 1500 [I.V.:1500]  Out: 1830 [Urine:1555; Blood:275]    LABS:   Lab Results   Component Value Date    WBC 10.08 2023    HGB 10.9 (L) 2023    HCT 33.2 (L) 2023    MCV 87.8 2023     (L) 2023       Infant: child       Assessment/ plan   1.  POD 1- 34 wk IUFD with preeclampsia. On PP magnesium for 24 hours. BPs currently stable. Will repeat labs today to follow trend. May need additional hypertensive therapy. She is having some post operative pain this morning, but not more than expected.   2. Yamilex is appropriately heartbroken and feeling a lot of anxiety. She reports being hospitalized for a panic attack in the past. Will order some PO ativan for PRN use. Also will discuss starting SSRI. She has met with the  this morning and will meet with jesús hillman today as well. She and her  decline an autopsy or  further eval at this time. They have appropriate questions about the safety of another pregnancy.        Active Problems:   None      Mile Reeves MD  4/28/2023 13:24 EDT

## 2023-04-28 NOTE — ANESTHESIA POSTPROCEDURE EVALUATION
Patient: Yamilex Carty    Procedure Summary     Date: 23 Room / Location: [facility] LABOR DELIVERY   [facility] LABOR DELIVERY    Anesthesia Start:  Anesthesia Stop:     Procedure:  SECTION REPEAT (Abdomen) Diagnosis:     Surgeons: Randall Doll MD Provider: Karin Anderson DO    Anesthesia Type: spinal, ITN ASA Status: 2          Anesthesia Type: spinal, ITN    Vitals  Vitals Value Taken Time   /43 230   Temp 97.8    Pulse 96 23   Resp 15    SpO2 100 % 23   Vitals shown include unvalidated device data.        Post Anesthesia Care and Evaluation    Patient location during evaluation: bedside  Patient participation: complete - patient participated  Level of consciousness: awake and awake and alert  Pain score: 0  Pain management: satisfactory to patient    Airway patency: patent  Anesthetic complications: No anesthetic complications  PONV Status: none  Cardiovascular status: acceptable, hemodynamically stable and stable  Respiratory status: acceptable  Hydration status: stable  Post Neuraxial Block status: No signs or symptoms of PDPH

## 2023-04-28 NOTE — OP NOTE
Operative Note    Patient name: Yamilex Carty  YOB: 1990   MRN: [id number]  Admission Date: 2023  Referring Provider: Mile Reeves MD    ID: 33 y.o.  at 34w1d    Pre-Operative Dx:   1. Intrauterine pregnancy at 34w1d weeks   2. Previous  section - declines   3. Intrauterine fetal demise      Postoperative dx:    1. Intrauterine pregnancy at 34w1d weeks 2.   Abruptio placenta  3. Previous  section - declines   4. Intrauterine fetal demise            Procedure(s): repeatlow transverse  delivery     Surgeons: Surgeon(s) and Role:     * Randall Doll MD - Primary     * Gary Esqueda MD - Assisting    Staff:  Surgeon(s):  Randall Doll MD O'Broin, Seamus, MD                Anesthesia: Spinal    Estimated Blood Loss: 220 mL    IV Fluids: 1900 mls    Preoperative antibiotic: Ancef (cefazolin) 2 grams and Azithromycin    Blood products:   Blood Administration Record (From admission, onward)    None          Pathology:   Order Name Source Comment Collection Info Order Time   TISSUE PATHOLOGY EXAM Placenta  Collected By: Randall Doll MD 2023 10:14 PM     Release to patient   Routine Release            Drains: Olivares catheter to gravity    Complications: None    Condition: Stable to recovery room    Infant:                Gender: child  infant    Weight: No birth weight on file.     Apgars: 0  @ 1 minute /     0  @ 5 minutes    Cord gases: Venous:  No components found for:  PHCVEN,  BECVEN      Arterial:  No components found for:  PHCART,  BECART          Operative Summary: Patient counseled about the risks of a  including the possibility of bleeding infection damage to bowel bladder ureter as well as postop issues like blood clots hematomas pneumonias.  Patient declines vaginal trial wants to proceed with a repeat .   After obtaining informed consent the patient was taken to the operating room where adequate anesthesia was  obtained.  Olivares catheter was placed in the bladder preoperatively.  IV antibiotics were given preoperatively.       The abdomen was prepped and draped in the usual sterile fashion for  delivery.  After confirming adequate anesthesia a Pfannenstiel skin incision was made with the scalpel and carried through to the underlying layer of fascia.  The fascia was incised in the midline and the incision extended laterally with the Littlejohn scissors and with blunt dissection.       The upper aspect of the fascia was grasped with 2 Kocher clamps, elevated, and dissected off the underlying rectus muscles bluntly and with the Littlejohn scissors.  The Kocher clamps were removed and applied to the inferior aspect of the fascia.  The fascia was dissected off of the rectus muscles in the same fashion.  The peritoneum was entered bluntly.  The incision was stretched and the bladder blade and Louise retractor inserted for visualization of the uterus. A bladder flap was made and bladder blade replaced.        The uterus was incised with the scalpel in a low transverse fashion.  The uterine incision was entered digitally and the incision extended bluntly in a craniocaudal fashion.  Retractors were removed and membranes were ruptured.  The infant was delivered atraumatically from transverse presentation by complete breech extraction.  The umbilical cord , clamped and cut ,  The infant was handed off to waiting pediatric staff.       Cord blood gases were not collected.  Cord blood was collected.  The placenta was removed using cord traction and uterine massage, the placenta showed a 10% abruption..  The uterus was exteriorized and cleared of all clots and debris.  The uterine incision was repaired with #1 chromic in a running locked fashion. A double layer technique was used.  Additional hemostatic measures required: electrocautery and figure-of-eight sutures.    The incision was inspected and excellent hemostasis was noted.  The  tubes and ovaries were noted to be normal.   The uterus was returned to the abdomen.  The gutters were cleared of all clots and debris.  Irrigation was used.  The uterine incision was again inspected and found to be hemostatic.       The peritoneum was reapproximated with 2-0 Vicryl in a running fashion.  The fascia was closed with 0 Vicryl in a running fashion.  The subcutaneous space was reapproximated using 3-0 plain gut in interrupted stiches.      The skin was closed using staples, and dressing placed. All sharp, instrument, and sponge counts were correct. The patient was transferred to the recovery room in stable condition.    Surgical Assist:  Assisting: Gary Esqueda MD  was responsible for performing the following activities: Retraction, Suction, Irrigation, Suturing, Closing and Delivery of Fetus and their skilled assistance was necessary for the success of this case.      Randall Doll MD  4/27/2023

## 2023-04-28 NOTE — ANESTHESIA POSTPROCEDURE EVALUATION
Patient: Yamilex Carty    Procedure Summary     Date: 23 Room / Location: [facility] LABOR DELIVERY   [facility]OR DELIVERY    Anesthesia Start:  Anesthesia Stop:     Procedure:  SECTION REPEAT (Abdomen) Diagnosis:     Surgeons: Randall Doll MD Provider: Karin Anderson DO    Anesthesia Type: spinal, ITN ASA Status: 2          Anesthesia Type: spinal, ITN    Vitals  Vitals Value Taken Time   /72 23 1136   Temp 97.9 °F (36.6 °C) 23 0930   Pulse 116 23 1219   Resp 14 23 1135   SpO2 97 % 23 1219   Vitals shown include unvalidated device data.        Post Anesthesia Care and Evaluation    Patient location during evaluation: bedside  Patient participation: complete - patient participated  Level of consciousness: awake and alert  Pain management: adequate    Airway patency: patent  Anesthetic complications: No anesthetic complications    Cardiovascular status: acceptable  Respiratory status: acceptable  Hydration status: acceptable  Post Neuraxial Block status: Motor and sensory function returned to baseline and No signs or symptoms of PDPH

## 2023-04-29 LAB
ALP SERPL-CCNC: 171 U/L (ref 39–117)
ALT SERPL W P-5'-P-CCNC: 53 U/L (ref 1–33)
AST SERPL-CCNC: 56 U/L (ref 1–32)
BASOPHILS # BLD AUTO: 0.01 10*3/MM3 (ref 0–0.2)
BASOPHILS NFR BLD AUTO: 0.1 % (ref 0–1.5)
BILIRUB SERPL-MCNC: 0.3 MG/DL (ref 0–1.2)
CREAT SERPL-MCNC: 0.53 MG/DL (ref 0.57–1)
DEPRECATED RDW RBC AUTO: 46.7 FL (ref 37–54)
EOSINOPHIL # BLD AUTO: 0.11 10*3/MM3 (ref 0–0.4)
EOSINOPHIL NFR BLD AUTO: 0.9 % (ref 0.3–6.2)
ERYTHROCYTE [DISTWIDTH] IN BLOOD BY AUTOMATED COUNT: 14.8 % (ref 12.3–15.4)
HCT VFR BLD AUTO: 31.1 % (ref 34–46.6)
HGB BLD-MCNC: 10.3 G/DL (ref 12–15.9)
IMM GRANULOCYTES # BLD AUTO: 0.07 10*3/MM3 (ref 0–0.05)
IMM GRANULOCYTES NFR BLD AUTO: 0.6 % (ref 0–0.5)
LDH SERPL-CCNC: 618 U/L (ref 135–214)
LYMPHOCYTES # BLD AUTO: 1.29 10*3/MM3 (ref 0.7–3.1)
LYMPHOCYTES NFR BLD AUTO: 11 % (ref 19.6–45.3)
MCH RBC QN AUTO: 28.7 PG (ref 26.6–33)
MCHC RBC AUTO-ENTMCNC: 33.1 G/DL (ref 31.5–35.7)
MCV RBC AUTO: 86.6 FL (ref 79–97)
MONOCYTES # BLD AUTO: 0.32 10*3/MM3 (ref 0.1–0.9)
MONOCYTES NFR BLD AUTO: 2.7 % (ref 5–12)
NEUTROPHILS NFR BLD AUTO: 84.7 % (ref 42.7–76)
NEUTROPHILS NFR BLD AUTO: 9.88 10*3/MM3 (ref 1.7–7)
NRBC BLD AUTO-RTO: 0 /100 WBC (ref 0–0.2)
PLATELET # BLD AUTO: 123 10*3/MM3 (ref 140–450)
PMV BLD AUTO: 11.1 FL (ref 6–12)
RBC # BLD AUTO: 3.59 10*6/MM3 (ref 3.77–5.28)
URATE SERPL-MCNC: 4.5 MG/DL (ref 2.4–5.7)
WBC NRBC COR # BLD: 11.68 10*3/MM3 (ref 3.4–10.8)

## 2023-04-29 PROCEDURE — 82565 ASSAY OF CREATININE: CPT | Performed by: OBSTETRICS & GYNECOLOGY

## 2023-04-29 PROCEDURE — 84550 ASSAY OF BLOOD/URIC ACID: CPT | Performed by: OBSTETRICS & GYNECOLOGY

## 2023-04-29 PROCEDURE — 0 MAGNESIUM SULFATE 20 GM/500ML SOLUTION: Performed by: OBSTETRICS & GYNECOLOGY

## 2023-04-29 PROCEDURE — 84460 ALANINE AMINO (ALT) (SGPT): CPT | Performed by: OBSTETRICS & GYNECOLOGY

## 2023-04-29 PROCEDURE — 82247 BILIRUBIN TOTAL: CPT | Performed by: OBSTETRICS & GYNECOLOGY

## 2023-04-29 PROCEDURE — 84450 TRANSFERASE (AST) (SGOT): CPT | Performed by: OBSTETRICS & GYNECOLOGY

## 2023-04-29 PROCEDURE — 84075 ASSAY ALKALINE PHOSPHATASE: CPT | Performed by: OBSTETRICS & GYNECOLOGY

## 2023-04-29 PROCEDURE — 0503F POSTPARTUM CARE VISIT: CPT | Performed by: OBSTETRICS & GYNECOLOGY

## 2023-04-29 PROCEDURE — 85025 COMPLETE CBC W/AUTO DIFF WBC: CPT | Performed by: OBSTETRICS & GYNECOLOGY

## 2023-04-29 PROCEDURE — 83615 LACTATE (LD) (LDH) ENZYME: CPT | Performed by: OBSTETRICS & GYNECOLOGY

## 2023-04-29 RX ORDER — LABETALOL 200 MG/1
200 TABLET, FILM COATED ORAL EVERY 8 HOURS SCHEDULED
Status: DISCONTINUED | OUTPATIENT
Start: 2023-04-29 | End: 2023-04-30 | Stop reason: HOSPADM

## 2023-04-29 RX ADMIN — LABETALOL HYDROCHLORIDE 200 MG: 200 TABLET, FILM COATED ORAL at 21:44

## 2023-04-29 RX ADMIN — IBUPROFEN 600 MG: 600 TABLET, FILM COATED ORAL at 06:40

## 2023-04-29 RX ADMIN — LEVOTHYROXINE SODIUM 125 MCG: 25 TABLET ORAL at 06:40

## 2023-04-29 RX ADMIN — Medication 10 ML: at 21:00

## 2023-04-29 RX ADMIN — IBUPROFEN 600 MG: 600 TABLET, FILM COATED ORAL at 12:26

## 2023-04-29 RX ADMIN — PRENATAL VITAMINS-IRON FUMARATE 27 MG IRON-FOLIC ACID 0.8 MG TABLET 1 TABLET: at 08:39

## 2023-04-29 RX ADMIN — SIMETHICONE 80 MG: 80 TABLET, CHEWABLE ORAL at 09:06

## 2023-04-29 RX ADMIN — LABETALOL HYDROCHLORIDE 200 MG: 200 TABLET, FILM COATED ORAL at 14:11

## 2023-04-29 RX ADMIN — SERTRALINE 50 MG: 50 TABLET, FILM COATED ORAL at 08:39

## 2023-04-29 RX ADMIN — DEXTROSE AND SODIUM CHLORIDE 75 ML/HR: 5; 200 INJECTION, SOLUTION INTRAVENOUS at 05:37

## 2023-04-29 RX ADMIN — ACETAMINOPHEN 325MG 650 MG: 325 TABLET ORAL at 21:44

## 2023-04-29 RX ADMIN — SIMETHICONE 80 MG: 80 TABLET, CHEWABLE ORAL at 03:36

## 2023-04-29 RX ADMIN — ACETAMINOPHEN 325MG 650 MG: 325 TABLET ORAL at 15:59

## 2023-04-29 RX ADMIN — ACETAMINOPHEN 325MG 650 MG: 325 TABLET ORAL at 03:28

## 2023-04-29 RX ADMIN — IBUPROFEN 600 MG: 600 TABLET, FILM COATED ORAL at 18:08

## 2023-04-29 RX ADMIN — MAGNESIUM SULFATE IN WATER 2 G/HR: 40 INJECTION, SOLUTION INTRAVENOUS at 05:37

## 2023-04-29 NOTE — PROGRESS NOTES
2023    Name:Yamilex Carty    MR#:[id number]     PROGRESS NOTE:  Post-Op 2 S/P    HD:2    Subjective   33 y.o. yo Female  s/p CS at 34w1d  For IUFD, prior c/s, severe PIH with concern for impending HELLP.  Overall doing as well as can be expected. Pain well controlled. Tolerating regular diet and having flatus. Lochia normal.     Patient Active Problem List   Diagnosis   • Pregnancy   • Previous  section   • History of pregnancy induced hypertension   • Disorder of thyroid, antepartum   • Rh negative, antepartum   • IUFD at 20 weeks or more of gestation        Objective    Vitals  Temp:  Temp:  [97.4 °F (36.3 °C)-98.4 °F (36.9 °C)] 98.3 °F (36.8 °C)  Temp src: Oral  BP:  BP: (116-146)/(61-88) 140/86  Pulse:  Heart Rate:  [] 101  RR:   Resp:  [14-20] 18    General Awake, alert, no distress  Abdomen Soft, non-distended, fundus firm, below umbilicus, appropriately tender  Incision  Intact, no erythema or exudate  Extremities Calves NT bilaterally     I/O last 3 completed shifts:  In: 3211.3 [P.O.:800; I.V.:2411.3]  Out: 7115 [Urine:6840; Blood:275]    LABS:   Lab Results   Component Value Date    WBC 11.68 (H) 2023    HGB 10.3 (L) 2023    HCT 31.1 (L) 2023    MCV 86.6 2023     (L) 2023       PEP:   Lab Results   Component Value Date    ALKPHOS 171 (H) 2023    ALT 53 (H) 2023    AST 56 (H) 2023    CREATININE 0.53 (L) 2023    BILITOT 0.3 2023     (H) 2023    URICACID 4.5 2023       Infant: male       Assessment   1.  POD 2 s/p C/S with IUFD at 34 weeks    2.  PIH    Plan:   Hemodynamically stable and labs improving.  D/C  today and start antihypertensives.        Marilyn Gtz MD  2023 11:37 EDT

## 2023-04-30 VITALS
WEIGHT: 240 LBS | DIASTOLIC BLOOD PRESSURE: 73 MMHG | HEART RATE: 83 BPM | TEMPERATURE: 98.8 F | HEIGHT: 65 IN | SYSTOLIC BLOOD PRESSURE: 130 MMHG | OXYGEN SATURATION: 96 % | RESPIRATION RATE: 16 BRPM | BODY MASS INDEX: 39.99 KG/M2

## 2023-04-30 PROBLEM — Z34.90 PREGNANCY: Status: RESOLVED | Noted: 2018-10-18 | Resolved: 2023-04-30

## 2023-04-30 LAB
ALP SERPL-CCNC: 161 U/L (ref 39–117)
ALT SERPL W P-5'-P-CCNC: 34 U/L (ref 1–33)
AST SERPL-CCNC: 30 U/L (ref 1–32)
BASOPHILS # BLD AUTO: 0.02 10*3/MM3 (ref 0–0.2)
BASOPHILS NFR BLD AUTO: 0.2 % (ref 0–1.5)
BILIRUB SERPL-MCNC: 0.2 MG/DL (ref 0–1.2)
CREAT SERPL-MCNC: 0.71 MG/DL (ref 0.57–1)
DEPRECATED RDW RBC AUTO: 48.3 FL (ref 37–54)
EOSINOPHIL # BLD AUTO: 0.15 10*3/MM3 (ref 0–0.4)
EOSINOPHIL NFR BLD AUTO: 1.2 % (ref 0.3–6.2)
ERYTHROCYTE [DISTWIDTH] IN BLOOD BY AUTOMATED COUNT: 15.5 % (ref 12.3–15.4)
HCT VFR BLD AUTO: 27.2 % (ref 34–46.6)
HGB BLD-MCNC: 9 G/DL (ref 12–15.9)
IMM GRANULOCYTES # BLD AUTO: 0.08 10*3/MM3 (ref 0–0.05)
IMM GRANULOCYTES NFR BLD AUTO: 0.6 % (ref 0–0.5)
LDH SERPL-CCNC: 412 U/L (ref 135–214)
LYMPHOCYTES # BLD AUTO: 1.65 10*3/MM3 (ref 0.7–3.1)
LYMPHOCYTES NFR BLD AUTO: 13.1 % (ref 19.6–45.3)
MCH RBC QN AUTO: 29 PG (ref 26.6–33)
MCHC RBC AUTO-ENTMCNC: 33.1 G/DL (ref 31.5–35.7)
MCV RBC AUTO: 87.7 FL (ref 79–97)
MONOCYTES # BLD AUTO: 0.44 10*3/MM3 (ref 0.1–0.9)
MONOCYTES NFR BLD AUTO: 3.5 % (ref 5–12)
NEUTROPHILS NFR BLD AUTO: 10.25 10*3/MM3 (ref 1.7–7)
NEUTROPHILS NFR BLD AUTO: 81.4 % (ref 42.7–76)
NRBC BLD AUTO-RTO: 0 /100 WBC (ref 0–0.2)
PLATELET # BLD AUTO: 139 10*3/MM3 (ref 140–450)
PMV BLD AUTO: 10.3 FL (ref 6–12)
RBC # BLD AUTO: 3.1 10*6/MM3 (ref 3.77–5.28)
URATE SERPL-MCNC: 4.9 MG/DL (ref 2.4–5.7)
WBC NRBC COR # BLD: 12.59 10*3/MM3 (ref 3.4–10.8)

## 2023-04-30 PROCEDURE — 84450 TRANSFERASE (AST) (SGOT): CPT | Performed by: OBSTETRICS & GYNECOLOGY

## 2023-04-30 PROCEDURE — 82247 BILIRUBIN TOTAL: CPT | Performed by: OBSTETRICS & GYNECOLOGY

## 2023-04-30 PROCEDURE — 83615 LACTATE (LD) (LDH) ENZYME: CPT | Performed by: OBSTETRICS & GYNECOLOGY

## 2023-04-30 PROCEDURE — 84550 ASSAY OF BLOOD/URIC ACID: CPT | Performed by: OBSTETRICS & GYNECOLOGY

## 2023-04-30 PROCEDURE — 85025 COMPLETE CBC W/AUTO DIFF WBC: CPT | Performed by: OBSTETRICS & GYNECOLOGY

## 2023-04-30 PROCEDURE — 0503F POSTPARTUM CARE VISIT: CPT | Performed by: OBSTETRICS & GYNECOLOGY

## 2023-04-30 PROCEDURE — 84460 ALANINE AMINO (ALT) (SGPT): CPT | Performed by: OBSTETRICS & GYNECOLOGY

## 2023-04-30 PROCEDURE — 84075 ASSAY ALKALINE PHOSPHATASE: CPT | Performed by: OBSTETRICS & GYNECOLOGY

## 2023-04-30 PROCEDURE — 82565 ASSAY OF CREATININE: CPT | Performed by: OBSTETRICS & GYNECOLOGY

## 2023-04-30 RX ORDER — ALPRAZOLAM 1 MG/1
1 TABLET ORAL 2 TIMES DAILY PRN
Qty: 15 TABLET | Refills: 0 | Status: SHIPPED | OUTPATIENT
Start: 2023-04-30

## 2023-04-30 RX ORDER — LABETALOL 200 MG/1
200 TABLET, FILM COATED ORAL EVERY 8 HOURS SCHEDULED
Qty: 90 TABLET | Refills: 3 | Status: SHIPPED | OUTPATIENT
Start: 2023-04-30

## 2023-04-30 RX ORDER — HYDROCODONE BITARTRATE AND ACETAMINOPHEN 5; 325 MG/1; MG/1
1 TABLET ORAL ONCE
Status: COMPLETED | OUTPATIENT
Start: 2023-04-30 | End: 2023-04-30

## 2023-04-30 RX ORDER — NALOXONE HYDROCHLORIDE 4 MG/.1ML
SPRAY NASAL
Qty: 2 EACH | Refills: 0 | Status: SHIPPED | OUTPATIENT
Start: 2023-04-30

## 2023-04-30 RX ORDER — IBUPROFEN 600 MG/1
600 TABLET ORAL EVERY 6 HOURS
Qty: 30 TABLET | Refills: 0 | Status: SHIPPED | OUTPATIENT
Start: 2023-04-30

## 2023-04-30 RX ORDER — OXYCODONE HYDROCHLORIDE 5 MG/1
5 TABLET ORAL EVERY 4 HOURS PRN
Qty: 15 TABLET | Refills: 0 | Status: SHIPPED | OUTPATIENT
Start: 2023-04-30 | End: 2023-05-05

## 2023-04-30 RX ADMIN — ACETAMINOPHEN 325MG 650 MG: 325 TABLET ORAL at 08:14

## 2023-04-30 RX ADMIN — SERTRALINE 50 MG: 50 TABLET, FILM COATED ORAL at 08:14

## 2023-04-30 RX ADMIN — IBUPROFEN 600 MG: 600 TABLET, FILM COATED ORAL at 06:09

## 2023-04-30 RX ADMIN — ACETAMINOPHEN 325MG 650 MG: 325 TABLET ORAL at 04:12

## 2023-04-30 RX ADMIN — LABETALOL HYDROCHLORIDE 200 MG: 200 TABLET, FILM COATED ORAL at 14:08

## 2023-04-30 RX ADMIN — HYDROCODONE BITARTRATE AND ACETAMINOPHEN 1 TABLET: 5; 325 TABLET ORAL at 12:06

## 2023-04-30 RX ADMIN — PRENATAL VITAMINS-IRON FUMARATE 27 MG IRON-FOLIC ACID 0.8 MG TABLET 1 TABLET: at 08:14

## 2023-04-30 RX ADMIN — LEVOTHYROXINE SODIUM 125 MCG: 25 TABLET ORAL at 06:09

## 2023-04-30 RX ADMIN — LABETALOL HYDROCHLORIDE 200 MG: 200 TABLET, FILM COATED ORAL at 06:09

## 2023-04-30 RX ADMIN — IBUPROFEN 600 MG: 600 TABLET, FILM COATED ORAL at 12:06

## 2023-04-30 RX ADMIN — IBUPROFEN 600 MG: 600 TABLET, FILM COATED ORAL at 00:25

## 2023-04-30 NOTE — DISCHARGE SUMMARY
Discharge Summary    Date of Admission: 2023  Date of Discharge:  2023      Patient: Yamilex Carty      MR#:[id number]    Primary Surgeon/OB: Randall Doll MD    Discharge Surgeon/OB: Marilyn Gtz MD    Presenting Problem/History of Present Illness  IUFD at 20 weeks or more of gestation [O36.4XX0]  IUFD at 20 weeks or more of gestation [O36.4XX0]     Patient Active Problem List   Diagnosis   • Previous  section   • History of pregnancy induced hypertension   • Disorder of thyroid, antepartum   • Rh negative, antepartum   • IUFD at 20 weeks or more of gestation         Discharge Diagnosis:  section at 34w1d    Procedures:  , Low Transverse     2023    10:09 PM        Rh Immune globulin given: yes      Early Discharge:  NO    Hospital Course  Patient is a 33 y.o. female  at 34w1d status post  section for IUFD at 34 weeks and severe preeclampsia.  She was maintained on Magmesium and labs were notalbe for temporary fall in platelets and bump in LFT.  All parameters were improved at discharge.  She was started on antihypertensive and d/c home stable.  She will f/u this week.  On discharge, ambulating, tolerating a regular diet without any difficulties and her incision is dry, clean and intact.     Infant:   male  fetus 1715 g (3 lb 12.5 oz)  with Apgar scores of 0 , 0  at five minutes.    Condition on Discharge:  Stable    Vital Signs  Temp:  [98.1 °F (36.7 °C)-99.3 °F (37.4 °C)] 98.8 °F (37.1 °C)  Heart Rate:  [83-99] 83  Resp:  [16-18] 16  BP: (105-137)/(50-77) 130/73    Lab Results   Component Value Date    WBC 12.59 (H) 2023    HGB 9.0 (L) 2023    HCT 27.2 (L) 2023    MCV 87.7 2023     (L) 2023       Discharge Disposition  Home or Self Care    Discharge Medications     Discharge Medications      ASK your doctor about these medications      Instructions Start Date   aspirin 81 MG EC tablet   No dose, route, or frequency  recorded.      ferrous sulfate 140 (45 Fe) MG tablet controlled-release tablet   Oral, Daily With Breakfast      levothyroxine 125 MCG tablet  Commonly known as: SYNTHROID, LEVOTHROID   125 mcg, Oral, Daily      Prenatal 27-1 27-1 MG tablet tablet   1 tablet, Oral, Daily             Discharge Diet:     Activity at Discharge:   Activity Instructions     Activity as Tolerated      Driving Restrictions      Type of Restriction: Driving    Driving Restrictions: No Driving Until Next Appointment    Other Instructions (Specify)      No driving for 2 weeks, No lifting over 10lbs for 6 weeks.    Pelvic Rest      Pelvic rest including no tampons, no tub baths, and no intercourse until 6 weeks/cleared by Provider.          Follow-up Appointments  Future Appointments   Date Time Provider Department Center   5/3/2023  1:00 PM [facility]   5/3/2023  1:40 PM Mile Reeves MD [facility]     Additional Instructions for the Follow-ups that You Need to Schedule     Call MD for problems / concerns.   As directed      Please call with concerns of depression.    Order Comments: Please call with concerns of depression.          Discharge Follow-up with Specialty: [facility]; 1 Week   As directed      Specialty: [facility]    Follow Up: 1 Week    Follow Up Details: for BP check               Marilyn Gtz MD  04/30/23  14:25 EDT  Csd

## 2023-05-01 LAB
CYTO UR: NORMAL
LAB AP CASE REPORT: NORMAL
LAB AP CLINICAL INFORMATION: NORMAL
LAB AP DIAGNOSIS COMMENT: NORMAL
PATH REPORT.FINAL DX SPEC: NORMAL
PATH REPORT.GROSS SPEC: NORMAL

## 2023-05-04 ENCOUNTER — POSTPARTUM VISIT (OUTPATIENT)
Dept: OBSTETRICS AND GYNECOLOGY | Facility: CLINIC | Age: 33
End: 2023-05-04
Payer: COMMERCIAL

## 2023-05-04 VITALS
WEIGHT: 222.4 LBS | DIASTOLIC BLOOD PRESSURE: 88 MMHG | BODY MASS INDEX: 37.05 KG/M2 | SYSTOLIC BLOOD PRESSURE: 138 MMHG | HEIGHT: 65 IN

## 2023-05-04 DIAGNOSIS — O09.299 HX OF PREECLAMPSIA, PRIOR PREGNANCY, CURRENTLY PREGNANT: Primary | ICD-10-CM

## 2023-05-04 DIAGNOSIS — O36.4XX0 IUFD AT 20 WEEKS OR MORE OF GESTATION: ICD-10-CM

## 2023-05-04 NOTE — PROGRESS NOTES
Chief Complaint   Patient presents with   • Postpartum Care       Postpartum blood pressure check visit         Yamilex Carty is a 33 y.o.  who presents today for a blood pressure check.    , Low Transverse    Information for the patient's :  Alka Carty FD [[id number]]   2023   male   Alka Carty   1715 g (3 lb 12.5 oz)   Gestational Age: 34w1d      Baby Discharged: IUFD 23  Delivering Physician: Randall Doll MD    The patient was diagnosed with IUFD and Preeclampsia at 34 weeks gestation. She received magnesium after delivery.  The patient did go home on blood pressure medication.  She was prescribed labetolol 200 mg tid. Her BP has been averaging low 140s/80s since discharge from the hospital. The patient complains of Headache and Swelling. She states she had swelling in her feet yesterday due to her being on her feet all day. The patient denies Right upper quadrant/ epigastric pain and Vision changes.     Patient describes her vaginal bleeding as light.  She denies bowel or bladder issues.    Patient reports postpartum depression. Postpartum Depression Screening Questionnaire: 21.    The additional following portions of the patient's history were reviewed and updated as appropriate: allergies, current medications, past family history, past medical history, past social history, past surgical history and problem list.      Review of Systems   Constitutional: Negative.    HENT: Negative.    Eyes: Negative.    Respiratory: Negative.    Cardiovascular: Negative.    Gastrointestinal: Negative.    Endocrine: Negative.    Genitourinary: Negative.    Musculoskeletal: Negative.    Skin: Positive for wound.   Allergic/Immunologic: Negative.    Neurological: Negative.    Hematological: Negative.    Psychiatric/Behavioral: Positive for depressed mood. The patient is nervous/anxious.      All other systems reviewed and are negative.     I have reviewed and agree  "with the HPI, ROS, and historical information as entered above. Maritza Tran, APRN    Objective   /88   Ht 165.1 cm (65\")   Wt 101 kg (222 lb 6.4 oz)   LMP 2022   Breastfeeding Unknown   BMI 37.01 kg/m²     Physical Exam  Vitals and nursing note reviewed.   Constitutional:       Appearance: Normal appearance. She is well-developed.   HENT:      Head: Normocephalic and atraumatic.   Cardiovascular:      Rate and Rhythm: Normal rate and regular rhythm.   Pulmonary:      Effort: Pulmonary effort is normal.      Breath sounds: Normal breath sounds.   Abdominal:      General: A surgical scar is present.      Palpations: Abdomen is soft. Abdomen is not rigid.      Comments: LTI healing well. Steri strips intact. No S/S infection   Musculoskeletal:      Cervical back: Normal range of motion.   Neurological:      Mental Status: She is alert and oriented to person, place, and time.   Psychiatric:         Behavior: Behavior normal.              Assessment and Plan    Problem List Items Addressed This Visit        Gravid and     IUFD at 20 weeks or more of gestation    Relevant Medications    ALPRAZolam (Xanax) 1 MG tablet   Other Visit Diagnoses     Hx of preeclampsia, prior pregnancy, currently pregnant    -  Primary    Relevant Orders    AlP+ALT+AST+Creat+LD+TBili+..        Yamilex is understandably sad and tearful with the loss of her baby. They had a  for her baby yesterday. She has a good family support system and is being cared for. She reports that she has been \"floating through the last week and feels numb\". She reports very little incisional pain. She says she is having a difficult time sleeping and doesn't want to be alone at night and so is taking 1/2 a xanax. She does not desire a therapy referral or increase in zoloft at this time. She verbalizes that she will reach out to us if she needs this. She also has the information from the  loss team.     1. S/p "  for IUFD and preeclampsia  2. Continued pelvic rest.  No driving until after 2 weeks.  3. Continue monitoring BP at home and call for BP >150/90. Continue labetalol 200 mg tid for now. Will discuss weaning at next appt.   4. PEP today  5. FU in 1 week    Maritza Tran, APRN  2023

## 2023-05-05 LAB
ALP SERPL-CCNC: 223 U/L (ref 39–117)
ALT SERPL-CCNC: 24 U/L (ref 1–33)
AST SERPL-CCNC: 17 U/L (ref 1–32)
BASOPHILS # BLD AUTO: 0.04 10*3/MM3 (ref 0–0.2)
BASOPHILS NFR BLD AUTO: 0.5 % (ref 0–1.5)
BILIRUB SERPL-MCNC: 0.2 MG/DL (ref 0–1.2)
CREAT SERPL-MCNC: 0.71 MG/DL (ref 0.57–1)
EGFRCR SERPLBLD CKD-EPI 2021: 115.3 ML/MIN/1.73
EOSINOPHIL # BLD AUTO: 0.14 10*3/MM3 (ref 0–0.4)
EOSINOPHIL NFR BLD AUTO: 1.6 % (ref 0.3–6.2)
ERYTHROCYTE [DISTWIDTH] IN BLOOD BY AUTOMATED COUNT: 14.4 % (ref 12.3–15.4)
HCT VFR BLD AUTO: 30.2 % (ref 34–46.6)
HGB BLD-MCNC: 10.1 G/DL (ref 12–15.9)
IMM GRANULOCYTES # BLD AUTO: 0.12 10*3/MM3 (ref 0–0.05)
IMM GRANULOCYTES NFR BLD AUTO: 1.4 % (ref 0–0.5)
LDH SERPL L TO P-CCNC: 277 U/L (ref 135–214)
LYMPHOCYTES # BLD AUTO: 1.39 10*3/MM3 (ref 0.7–3.1)
LYMPHOCYTES NFR BLD AUTO: 15.9 % (ref 19.6–45.3)
MCH RBC QN AUTO: 29.1 PG (ref 26.6–33)
MCHC RBC AUTO-ENTMCNC: 33.4 G/DL (ref 31.5–35.7)
MCV RBC AUTO: 87 FL (ref 79–97)
MONOCYTES # BLD AUTO: 0.47 10*3/MM3 (ref 0.1–0.9)
MONOCYTES NFR BLD AUTO: 5.4 % (ref 5–12)
NEUTROPHILS # BLD AUTO: 6.56 10*3/MM3 (ref 1.7–7)
NEUTROPHILS NFR BLD AUTO: 75.2 % (ref 42.7–76)
NRBC BLD AUTO-RTO: 0.1 /100 WBC (ref 0–0.2)
PLATELET # BLD AUTO: 280 10*3/MM3 (ref 140–450)
RBC # BLD AUTO: 3.47 10*6/MM3 (ref 3.77–5.28)
URATE SERPL-MCNC: 6.8 MG/DL (ref 2.4–5.7)
WBC # BLD AUTO: 8.72 10*3/MM3 (ref 3.4–10.8)

## 2023-05-09 ENCOUNTER — TELEPHONE (OUTPATIENT)
Dept: LABOR AND DELIVERY | Facility: HOSPITAL | Age: 33
End: 2023-05-09
Payer: COMMERCIAL

## 2023-05-11 ENCOUNTER — POSTPARTUM VISIT (OUTPATIENT)
Dept: OBSTETRICS AND GYNECOLOGY | Facility: CLINIC | Age: 33
End: 2023-05-11
Payer: COMMERCIAL

## 2023-05-11 VITALS
HEIGHT: 65 IN | WEIGHT: 216.6 LBS | SYSTOLIC BLOOD PRESSURE: 148 MMHG | DIASTOLIC BLOOD PRESSURE: 80 MMHG | BODY MASS INDEX: 36.09 KG/M2

## 2023-05-11 DIAGNOSIS — Z87.59 HISTORY OF HEMOLYSIS, ELEVATED LIVER ENZYMES, AND LOW PLATELET (HELLP) SYNDROME: ICD-10-CM

## 2023-05-11 DIAGNOSIS — O36.4XX0 IUFD AT 20 WEEKS OR MORE OF GESTATION: ICD-10-CM

## 2023-05-11 NOTE — PROGRESS NOTES
Chief Complaint   Patient presents with   • Postpartum Care     2 wks       2 Week Postpartum Visit         Yamilex Carty is a 33 y.o.  who presents today for a 2 week postpartum check. Bps have been 120's/70's at home. She has not been taking her labetalol in the morning because of her normal range blood pressure. She states she feels like her BP is higher in the afternoon or evening so she may start taking her labetalol then.       , Low Transverse    Information for the patient's :  Alka Carty FD [[id number]]   2023   male   Alka Carty   1715 g (3 lb 12.5 oz)   Gestational Age: 34w1d      Delivering MD: Randall Doll MD.    Pregnancy complications: placental abruption    Patient reports her incision is clean, dry, intact. Patient describes vaginal bleeding as moderate. Patient denies bowel or bladder issues.    She is unsure what she wants to do for contraception, she thought she was infertile so they were not on anything when she conceived.     Patient reports postpartum depression. Postpartum Depression Screening Questionnaire: 15, treatment indicated. Pt started taking zoloft after delivery and states overall she feels like it is helping.       The additional following portions of the patient's history were reviewed and updated as appropriate: allergies, current medications, past family history, past medical history, past social history, past surgical history and problem list.      Review of Systems   Constitutional: Negative.    Respiratory: Negative.    Cardiovascular: Negative.    Gastrointestinal: Negative.    Genitourinary: Positive for vaginal bleeding.   Skin: Positive for wound (c section incision).   Psychiatric/Behavioral: Positive for depressed mood and stress. The patient is nervous/anxious.        I have reviewed and agree with the HPI, ROS, and historical information as entered above. Karon Chavez, APRN    Objective   /80    "Ht 165.1 cm (65\")   Wt 98.2 kg (216 lb 9.6 oz)   LMP 2022   Breastfeeding No   BMI 36.04 kg/m²     Physical Exam  Constitutional:       Appearance: Normal appearance.   Pulmonary:      Effort: Pulmonary effort is normal.   Abdominal:      Palpations: Abdomen is soft.      Comments: Incision well healed. CDI   Neurological:      Mental Status: She is alert.              Assessment and Plan    Problem List Items Addressed This Visit        Gravid and     IUFD at 20 weeks or more of gestation    Relevant Medications    ALPRAZolam (Xanax) 1 MG tablet    Other Relevant Orders    Ambulatory MFM Referral to [facility]    History of hemolysis, elevated liver enzymes, and low platelet (HELLP) syndrome    Overview     LFT's and platelets normalized by  1 week PP. S/P MGSO4         Relevant Orders    Ambulatory MFM Referral to [facility]   Other Visit Diagnoses     Postpartum depression    -  Primary        She is coping as well as can be expected given the circumstances. She has all of the resources from  bereavement and is noticing some improvement with the zoloft. Her [place] improved from 21 last week to 15 today with no SI/HI. She has good support at home. Declines outside referral to counseling services at this point.   BP has been normal to mild range at home. We will decrease labetalol to 100 mg bid and she will continue to monitor at home. She can break her current tablets in half and we discussed hypotensive symptoms and weaning labetalol. Denies headache, vision changes, epigastric pain.    1. S/p , 2 weeks postpartum.  Doing well.    2. Continued pelvic rest with a return to driving and light physical activity.  3. Signs of postpartum depression - discussed options.  Encouraged consideration of counseling and encouraged to consider SSRI.  4. Contraception: contraceptive methods: considering options  Return in about 4 weeks (around 2023) for [place] PP. She would like to see [facility] the same day " as her 6 week F/U to discuss the risks of future pregnancy and a plan should they go that route.     Karon Chavez, APRN  05/11/2023

## 2023-05-23 NOTE — TELEPHONE ENCOUNTER
Dr Gtz patient  Last seen 05/11/2023  Future appt 06/12/2023      Received paper fax refill request for 90 day supply of Sertraline 50 mg. 90 day supply sent with additional refills.

## 2023-05-30 ENCOUNTER — TELEPHONE (OUTPATIENT)
Dept: OBSTETRICS AND GYNECOLOGY | Facility: CLINIC | Age: 33
End: 2023-05-30

## 2023-05-30 NOTE — TELEPHONE ENCOUNTER
Patient stopped taking labetalol about 1.5 weeks ago and reports BP's have been <120s/70s.  Patient reports her incision has healed well and she thinks she has started a period. She is doing well on Zoloft and is scheduled for her next visit in 2 weeks.  She would like to return to work.  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  Dr. Reeves approved release to work and patient aware to keep 6 week follow up appointment and to contact us sooner with any concerns.

## 2023-05-30 NOTE — TELEPHONE ENCOUNTER
Patient is calling to see if she can be release back to work. 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 Please call patient back with status. Thanks

## 2023-06-12 ENCOUNTER — OFFICE VISIT (OUTPATIENT)
Dept: OBSTETRICS AND GYNECOLOGY | Facility: HOSPITAL | Age: 33
End: 2023-06-12
Payer: COMMERCIAL

## 2023-06-12 ENCOUNTER — POSTPARTUM VISIT (OUTPATIENT)
Dept: OBSTETRICS AND GYNECOLOGY | Facility: CLINIC | Age: 33
End: 2023-06-12
Payer: COMMERCIAL

## 2023-06-12 VITALS
DIASTOLIC BLOOD PRESSURE: 70 MMHG | WEIGHT: 206 LBS | HEIGHT: 65 IN | SYSTOLIC BLOOD PRESSURE: 138 MMHG | BODY MASS INDEX: 34.32 KG/M2

## 2023-06-12 VITALS
WEIGHT: 205 LBS | DIASTOLIC BLOOD PRESSURE: 74 MMHG | HEIGHT: 65 IN | BODY MASS INDEX: 34.16 KG/M2 | SYSTOLIC BLOOD PRESSURE: 122 MMHG

## 2023-06-12 DIAGNOSIS — Z87.59 HISTORY OF HEMOLYSIS, ELEVATED LIVER ENZYMES, AND LOW PLATELET (HELLP) SYNDROME: ICD-10-CM

## 2023-06-12 DIAGNOSIS — O36.4XX0 IUFD AT 20 WEEKS OR MORE OF GESTATION: Primary | ICD-10-CM

## 2023-06-12 DIAGNOSIS — E07.9 DISORDER OF THYROID, ANTEPARTUM: ICD-10-CM

## 2023-06-12 DIAGNOSIS — O36.4XX0 IUFD AT 20 WEEKS OR MORE OF GESTATION: ICD-10-CM

## 2023-06-12 DIAGNOSIS — Z98.891 PREVIOUS CESAREAN SECTION: ICD-10-CM

## 2023-06-12 DIAGNOSIS — O99.280 DISORDER OF THYROID, ANTEPARTUM: ICD-10-CM

## 2023-06-12 PROCEDURE — 0503F POSTPARTUM CARE VISIT: CPT | Performed by: OBSTETRICS & GYNECOLOGY

## 2023-06-12 RX ORDER — NORGESTIMATE AND ETHINYL ESTRADIOL 0.25-0.035
1 KIT ORAL DAILY
Qty: 84 TABLET | Refills: 3 | Status: SHIPPED | OUTPATIENT
Start: 2023-06-12

## 2023-06-12 NOTE — PROGRESS NOTES
Maternal/Fetal Medicine Consult Note     Name: Yamilex Carty    : 1990     MRN: [id number]     Referring Provider: Mile Reeves MD    Chief Complaint  Pre conceptual counseling    Subjective     History of Present Illness:  Yamilex Carty is a 33 y.o.  with history of 34 week IUFD in late 2023 (delivered via RCS)   Patient with h/o  delivery of twin gestation in 2018 at 34 weeks gestation in the setting of preeclampsia.   In her last pregnancy (delivered 2023) the patient reported decreased fetal movement and was diagnosed with IUFD at 34 weeks GA. She had no symptoms of severe preeclampsia though she   Was noted to have elevated BP and lab abnormalities consistent with HELLP syndrome at the time of diagnosis. She reports that on her way to the hospital she began to experience intense abdominal pain and uterine tightening.   She underwent RCS and, from a physical perspective, recovered well. This delivery occurred 6 weeks ago.   NIPS was low risk   She has no h/o VTE   Her last pregnancy was complicated by hypothyroidism   Review of placental pathology revealed multiple small infarcts   She was taking low dose ASA     LISS: Estimated Date of Delivery: 23     ROS:   As noted in HPI.     Past Medical History:   Diagnosis Date   • anxiety     stopped meds    • Female infertility unexplained after evaluation    • Hypothyroidism 2017      Past Surgical History:   Procedure Laterality Date   •  SECTION N/A 10/20/2018    Procedure:  SECTION PRIMARY;  Surgeon: Bari Hughes DO;  Location:  [facility];  Service: Obstetrics/Gynecology   •  SECTION N/A 2023    Procedure:  SECTION REPEAT;  Surgeon: Randall Doll MD;  Location:  [facility];  Service: Obstetrics/Gynecology;  Laterality: N/A;   • ENDOSCOPY AND COLONOSCOPY     • FERTILITY SURGERY  2018    Oocyte Retrieval   • TONSILLECTOMY AND  "ADENOIDECTOMY     • WISDOM TOOTH EXTRACTION        OB History as of 2023        2    Para   2    Term   0       2    AB   0    Living   2       SAB   0    IAB   0    Ectopic   0    Molar   0    Multiple   1    Live Births   2          Obstetric Comments   Pregnancy #1 - IVF conception; fresh cycle; no donors. FOB #1 Twins  34 weeks P C/S Pre-eclampsia  FOB #1  Pregnancy # 2 conceived naturally, 34 week IUFD R C/S  pre-eclampsia and HELLP                 Objective     Vital Signs  /70   Ht 165.1 cm (65\")   Wt 93.4 kg (206 lb)   LMP 2023 (Exact Date)   Estimated body mass index is 34.28 kg/m² as calculated from the following:    Height as of this encounter: 165.1 cm (65\").    Weight as of this encounter: 93.4 kg (206 lb).      Placental Pathology:     PLACENTA, THIRD TRIMESTER, 34 WEEKS, 1 DAY,  SECTION:  Mature placenta (267 grams; 5th-10th percentile for gestational age)  Small for gestational age  Findings suggestive of maternal vascular malperfusion  Increased syncytial knots  Multiple small placental infarcts, approximately 15% of disc volume  Fetal membranes with early ischemic changes; negative for significant acute inflammation  Three-vessel umbilical cord with early ischemic changes; negative for significant acute inflammation      TSH 3.6 23     NIPS low risk     Glucola 128       Assessment and Plan     Diagnoses and all orders for this visit:    1. IUFD at 20 weeks or more of gestation (Primary)  Assessment & Plan:  We reviewed the typical work-up in the setting of IUFD (including evaluation for maternal, placental, fetal, infectious causes) though it appears that the cause of demise in this setting was likely HELLP syndrome leading to abruption.   Review of the placental pathology indicates that there were multiple small placental infarct.   Considering her history of IUFD and the placental infacts, I have recommended testing for APLS. These labs were " sent today. I explained that in order to have a diagnosis of APLS, the labs should be positive twice 12 weeks apart.     Orders:  -     Cancel: TSH  -     Cancel: Beta-2 Glycoprotein I Antibody,G / M  -     Cardiolipin Antibody, IgM  -     Cardiolipin Antibody, IgG  -     Lupus Anticoagulant    2. History of hemolysis, elevated liver enzymes, and low platelet (HELLP) syndrome  Assessment & Plan:  Women with a history of previous preeclampsia or HELLP are at increased risk of preeclampsia and other adverse pregnancy outcomes in subsequent pregnancies. The magnitude of this risk is dependent on gestational age at time of disease onset, severity of disease and the presence or absence of preexisting medical disorders. The objective in the management of these patients is to reduce risk factors by optimizing maternal health before conception and to detect obstetric complications as early as possible.   Considering that Ms Carty did not experience any symptoms until her disease was advanced, surveillance in any future pregnancy will require frequent outpatient visits and possible inpatient admission.   As before, she should take a low dose of aspirin. For this particular patient, I recommend 162mg PO qD       Orders:  -     Cancel: TSH  -     Cancel: Beta-2 Glycoprotein I Antibody,G / M  -     Cardiolipin Antibody, IgM  -     Cardiolipin Antibody, IgG  -     Lupus Anticoagulant    3. Disorder of thyroid, antepartum  -     Cancel: TSH  -     Cancel: Beta-2 Glycoprotein I Antibody,G / M  -     Cardiolipin Antibody, IgM  -     Cardiolipin Antibody, IgG  -     Lupus Anticoagulant    4. Previous  section       Follow Up  As needed     I spent 45 minutes caring for the patient on the day of service. This included: obtaining or reviewing a separately obtained medical history, reviewing patient records, performing a medically appropriate exam and/or evaluation, counseling or educating the patient/family/caregiver,  ordering medications, labs, and/or procedures and documenting such in the medical record. This does not include time spent on review and interpretation of other tests such as fetal ultrasound or the performance of other procedures such as amniocentesis or CVS.      Megan Guerra MD  06/12/2023  Answers for HPI/ROS submitted by the patient on 6/5/2023  Please describe your symptoms.: Hx preeclampsia x 2 pregnancies. Developed HELLP in most recent. Also experienced intrauterine fetal demise woth most recent. Need counseling on possibility of future pregnacies.  Have you had these symptoms before?: Yes  How long have you been having these symptoms?: Greater than 2 weeks  Please list any medications you are currently taking for this condition.: Synthroid 125mcg QD , Zoloft 50mg QD  What is the primary reason for your visit?: Other

## 2023-06-12 NOTE — PROGRESS NOTES
Chief Complaint   Patient presents with    Postpartum Care     After IUFD       6 Week Postpartum Visit         Yamilex Carty is a 33 y.o.  who presents today for a 6 week postpartum check.     C/S: repeat     , Low Transverse    Information for the patient's :  Alka Carty FD [[id number]]   2023   male   Alka Carty   1715 g (3 lb 12.5 oz)   Gestational Age: 34w1d        Delivering Physician: Randall Doll MD    At the time of delivery were you diagnosed with any of the following: IUFD and preeclampsia ? HELLP syndrome. The incision is healing well. Patient describes vaginal bleeding as stopped, period  that lasted 7 days and had some spotting yesterday.    She was seen with [facility] today for follow up. She had labs drawn that include: Lupus antibodies, anticardiolipin, glycoprotein, and TSH that is to be drawn here today.       Last Pap : 22. Results: negative. HPV: negative.   Last Completed Pap Smear            PAP SMEAR (Every 3 Years) Next due on 2025  LIQUID-BASED PAP SMEAR, [facility] ([facility])                      The additional following portions of the patient's history were reviewed and updated as appropriate: allergies, current medications, past family history, past medical history, past social history, past surgical history, and problem list.    Review of Systems   Constitutional: Negative.    HENT: Negative.     Eyes: Negative.    Respiratory: Negative.     Cardiovascular: Negative.    Gastrointestinal: Negative.    Endocrine: Negative.    Genitourinary: Negative.    Musculoskeletal: Negative.    Skin: Negative.    Allergic/Immunologic: Negative.    Neurological: Negative.    Hematological: Negative.    Psychiatric/Behavioral: Negative.     All other systems reviewed and are negative.     I have reviewed and agree with the HPI, ROS, and historical information as entered above. Mile Reeves MD      /74    "Ht 165.1 cm (65\")   Wt 93 kg (205 lb)   LMP 2023 (Exact Date)   BMI 34.11 kg/m²     Physical Exam  Vitals and nursing note reviewed. Exam conducted with a chaperone present.   Constitutional:       Appearance: She is well-developed.   HENT:      Head: Normocephalic and atraumatic.   Pulmonary:      Effort: Pulmonary effort is normal.   Abdominal:      General: A surgical scar is present.      Palpations: Abdomen is soft. Abdomen is not rigid.      Comments: Clean, Dry, and Intact.  No erythema.    Genitourinary:     Vagina: No lesions or prolapsed vaginal walls.      Cervix: No lesion or erythema.      Uterus: Enlarged. Not tender and no uterine prolapse.       Adnexa:         Right: No mass, tenderness or fullness.          Left: No mass, tenderness or fullness.     Musculoskeletal:      Cervical back: Normal range of motion.   Neurological:      Mental Status: She is alert and oriented to person, place, and time.   Psychiatric:         Mood and Affect: Mood normal.         Behavior: Behavior normal.           Assessment and Plan    Problem List Items Addressed This Visit       IUFD at 20 weeks or more of gestation    Relevant Orders    Anticardiolipin Antibody, IgG / M, Qn    Lupus Anticoagulant Panel    Beta-2 Glycoprotein I Antibody,G / M    TSH    History of hemolysis, elevated liver enzymes, and low platelet (HELLP) syndrome    Overview     LFT's and platelets normalized by  1 week PP. S/P MGSO4         Relevant Orders    Anticardiolipin Antibody, IgG / M, Qn    Lupus Anticoagulant Panel    Beta-2 Glycoprotein I Antibody,G / M    TSH     Other Visit Diagnoses       Postpartum exam    -  Primary    Relevant Orders    Anticardiolipin Antibody, IgG / M, Qn    Lupus Anticoagulant Panel    Beta-2 Glycoprotein I Antibody,G / M    TSH            S/p , 6 weeks postpartum.  Doing well physically and as best as can be expected emotionally. She feels better on the zoloft, and is back to work and doing " well there.  She will likely use her embryo at some point to TTC again.    Is off labetelol and BP normal.   Return to normal physical activity.  No pelvic restrictions.   Contraception: contraceptive methods: OCP (estrogen/progesterone)  Return for Annual physical.     Mile Reeves MD  06/12/2023

## 2023-06-13 ENCOUNTER — TELEPHONE (OUTPATIENT)
Dept: LABOR AND DELIVERY | Facility: HOSPITAL | Age: 33
End: 2023-06-13
Payer: COMMERCIAL

## 2023-06-13 NOTE — TELEPHONE ENCOUNTER
Spoke with Yamilex briefly. States she is at work currently, returned about 2 weeks ago. States it has helped to be back at work.I mentioned support group and encouraged Yamilex to contact PB program if she would like to receive the email link for the Zoom support group meetings. I also mentioned the information at bottom of support group schedule with national on-line support programs. I told her about [facility] services and she states she would like to receive the mailed invitation to those events. She denies questions or concerns at this time and I encouraged her to contact the PB program if those arise in the future.

## 2023-06-15 NOTE — ASSESSMENT & PLAN NOTE
Women with a history of previous preeclampsia or HELLP are at increased risk of preeclampsia and other adverse pregnancy outcomes in subsequent pregnancies. The magnitude of this risk is dependent on gestational age at time of disease onset, severity of disease and the presence or absence of preexisting medical disorders. The objective in the management of these patients is to reduce risk factors by optimizing maternal health before conception and to detect obstetric complications as early as possible.   Considering that Ms Carty did not experience any symptoms until her disease was advanced, surveillance in any future pregnancy will require frequent outpatient visits and possible inpatient admission.   As before, she should take a low dose of aspirin. For this particular patient, I recommend 162mg PO qD

## 2023-06-15 NOTE — ASSESSMENT & PLAN NOTE
We reviewed the typical work-up in the setting of IUFD (including evaluation for maternal, placental, fetal, infectious causes) though it appears that the cause of demise in this setting was likely HELLP syndrome leading to abruption.   Review of the placental pathology indicates that there were multiple small placental infarct.   Considering her history of IUFD and the placental infacts, I have recommended testing for APLS. These labs were sent today. I explained that in order to have a diagnosis of APLS, the labs should be positive twice 12 weeks apart.

## 2023-06-23 LAB
APTT HEX PL PPP: 8 SEC
APTT IMM NP PPP: ABNORMAL SEC
APTT PPP 1:1 SALINE: ABNORMAL SEC
APTT PPP: 31.2 SEC
B2 GLYCOPROT1 IGA SER-ACNC: <10 SAU
B2 GLYCOPROT1 IGG SER-ACNC: <10 SGU
B2 GLYCOPROT1 IGG SER-ACNC: <9 GPI IGG UNITS (ref 0–20)
B2 GLYCOPROT1 IGM SER-ACNC: <10 SMU
B2 GLYCOPROT1 IGM SER-ACNC: <9 GPI IGM UNITS (ref 0–32)
CARDIOLIPIN IGG SER IA-ACNC: <10 GPL
CARDIOLIPIN IGG SER IA-ACNC: <9 GPL U/ML (ref 0–14)
CARDIOLIPIN IGM SER IA-ACNC: <10 MPL
CARDIOLIPIN IGM SER IA-ACNC: <9 MPL U/ML (ref 0–12)
CONFIRM APTT: 0.2 SEC
CONFIRM DRVVT: ABNORMAL SEC
DRVVT SCREEN TO CONFIRM RATIO: ABNORMAL RATIO
INR PPP: 1 RATIO
LABORATORY COMMENT REPORT: ABNORMAL
PROTHROMBIN TIME: 10.8 SEC
SCREEN DRVVT: 38.1 SEC
THROMBIN TIME: 15.4 SEC
TSH SERPL DL<=0.005 MIU/L-ACNC: 2.21 UIU/ML (ref 0.45–4.5)

## (undated) DEVICE — SUT VIC 2/0 CT1 27IN J339H BX/36

## (undated) DEVICE — SUT GUT CHRM 2/0 CT1 27IN 811H

## (undated) DEVICE — PK C/SECT 10

## (undated) DEVICE — MAT PREVALON MOBL TRANSFR AIR WO/PAD 39X80IN

## (undated) DEVICE — SUT PLAIN  3/0 CT1 27IN 842H

## (undated) DEVICE — SUT GUT CHRM 1 CTX 36IN 905H

## (undated) DEVICE — GLV SURG BIOGEL LTX PF 7 1/2

## (undated) DEVICE — SUT PDS 0 CT1 36IN Z346H

## (undated) DEVICE — SOL IRR H2O BTL 1000ML STRL

## (undated) DEVICE — SKIN AFFIX SURG ADHESIVE 72/CS 0.55ML: Brand: MEDLINE

## (undated) DEVICE — TRY SPINE BLCK WHITACRE 25G 3X5IN

## (undated) DEVICE — SOL IRR NACL 0.9PCT BT 1000ML

## (undated) DEVICE — SUT PROLN PS/2 3/0 8622H BX/36

## (undated) DEVICE — PROXIMATE RH ROTATING HEAD SKIN STAPLERS (35 WIDE) CONTAINS 35 STAINLESS STEEL STAPLES: Brand: PROXIMATE

## (undated) DEVICE — GLV SURG BIOGEL LTX PF 6 1/2

## (undated) DEVICE — COATED VICRYL  (POLYGLACTIN 910) SUTURE, VIOLET BRAIDED, STERILE, SYNTHETIC ABSORBABLE SUTURE: Brand: COATED VICRYL